# Patient Record
Sex: FEMALE | Race: WHITE | HISPANIC OR LATINO | Employment: UNEMPLOYED | URBAN - METROPOLITAN AREA
[De-identification: names, ages, dates, MRNs, and addresses within clinical notes are randomized per-mention and may not be internally consistent; named-entity substitution may affect disease eponyms.]

---
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
## 2022-01-10 ENCOUNTER — OFFICE VISIT (OUTPATIENT)
Dept: MEDICAL GROUP | Facility: MEDICAL CENTER | Age: 22
End: 2022-01-10
Attending: FAMILY MEDICINE
Payer: MEDICAID

## 2022-01-10 VITALS
BODY MASS INDEX: 37.13 KG/M2 | HEIGHT: 57 IN | DIASTOLIC BLOOD PRESSURE: 66 MMHG | HEART RATE: 80 BPM | TEMPERATURE: 97.5 F | WEIGHT: 172.1 LBS | OXYGEN SATURATION: 97 % | SYSTOLIC BLOOD PRESSURE: 112 MMHG | RESPIRATION RATE: 16 BRPM

## 2022-01-10 DIAGNOSIS — H65.92 LEFT NON-SUPPURATIVE OTITIS MEDIA: ICD-10-CM

## 2022-01-10 DIAGNOSIS — B37.31 YEAST VAGINITIS: ICD-10-CM

## 2022-01-10 PROCEDURE — 99213 OFFICE O/P EST LOW 20 MIN: CPT | Performed by: FAMILY MEDICINE

## 2022-01-10 RX ORDER — AMOXICILLIN 500 MG/1
500 CAPSULE ORAL 3 TIMES DAILY
Qty: 21 CAPSULE | Refills: 0 | Status: SHIPPED | OUTPATIENT
Start: 2022-01-10 | End: 2022-01-12

## 2022-01-10 RX ORDER — FLUCONAZOLE 150 MG/1
150 TABLET ORAL DAILY
Qty: 2 TABLET | Refills: 0 | Status: SHIPPED | OUTPATIENT
Start: 2022-01-10 | End: 2022-01-12

## 2022-01-10 ASSESSMENT — FIBROSIS 4 INDEX: FIB4 SCORE: 0.34

## 2022-01-10 NOTE — PROGRESS NOTES
"Subjective     Joselin Natarajan is a 21 y.o. female who presents with Otalgia and Laryngitis            HPI one.  Acute left ear pain-patient reports onset last night of left sided ear pain.  There is been no drainage.  She denies any sore throat.  She reports that she has some mild cough and also hoarseness also in the past 16 to 24 hours.  She does not have a history of recurrent middle ear infections.    ROS           Objective     /66   Pulse 80   Temp 36.4 °C (97.5 °F) (Temporal)   Resp 16   Ht 1.448 m (4' 9.01\")   Wt 78.1 kg (172 lb 1.6 oz)   LMP 02/21/2021   SpO2 97%   BMI 37.23 kg/m²      Physical Exam      General- alert,cooperative patient in no acute distress  Ears- normal tm on the right without redness, perforation.  Left TM and canal show moderate pinkness diffusely.  Nares- clear, pink, moist mucosa without bleeding. No purulent nasal DC  Orophx.- lips normal. Clear, pink, moist mucosa without redness or exudate. Tongue is midline  Chest-Normal to auscultation and percussion, movement is symmetric. Nontender to palpation.                       Assessment & Plan        1. Left non-suppurative otitis media    - amoxicillin (AMOXIL) 500 MG Cap; Take 1 Capsule by mouth 3 times a day.  Dispense: 21 Capsule; Refill: 0    2. Yeast vaginitis-discussed possible yeast vaginitis using amoxicillin    - fluconazole (DIFLUCAN) 150 MG tablet; Take 1 Tablet by mouth every day.  Dispense: 2 Tablet; Refill: 0    Plan: 1.  Rx amoxicillin 500 mg 3 times daily x7 days  2.  Written Rx for Diflucan if yeast vaginitis symptoms develop            "

## 2022-01-12 ENCOUNTER — POST PARTUM (OUTPATIENT)
Dept: OBGYN | Facility: CLINIC | Age: 22
End: 2022-01-12
Payer: MEDICAID

## 2022-01-12 VITALS — BODY MASS INDEX: 37.64 KG/M2 | WEIGHT: 174 LBS | SYSTOLIC BLOOD PRESSURE: 100 MMHG | DIASTOLIC BLOOD PRESSURE: 60 MMHG

## 2022-01-12 PROBLEM — B95.1 GROUP BETA STREP POSITIVE: Status: RESOLVED | Noted: 2021-11-16 | Resolved: 2022-01-12

## 2022-01-12 PROBLEM — O09.893 SUPERVISION OF OTHER HIGH RISK PREGNANCIES, THIRD TRIMESTER: Status: RESOLVED | Noted: 2021-11-24 | Resolved: 2022-01-12

## 2022-01-12 PROBLEM — O26.10 INSUFFICIENT WEIGHT GAIN DURING PREGNANCY: Status: RESOLVED | Noted: 2021-11-09 | Resolved: 2022-01-12

## 2022-01-12 PROCEDURE — 0503F POSTPARTUM CARE VISIT: CPT | Performed by: ADVANCED PRACTICE MIDWIFE

## 2022-01-12 ASSESSMENT — FIBROSIS 4 INDEX: FIB4 SCORE: 0.34

## 2022-01-12 ASSESSMENT — EDINBURGH POSTNATAL DEPRESSION SCALE (EPDS)
THE THOUGHT OF HARMING MYSELF HAS OCCURRED TO ME: NEVER
I HAVE FELT SCARED OR PANICKY FOR NO GOOD REASON: NO, NOT AT ALL
THINGS HAVE BEEN GETTING ON TOP OF ME: NO, MOST OF THE TIME I HAVE COPED QUITE WELL
TOTAL SCORE: 3
I HAVE BEEN ANXIOUS OR WORRIED FOR NO GOOD REASON: HARDLY EVER
I HAVE LOOKED FORWARD WITH ENJOYMENT TO THINGS: AS MUCH AS I EVER DID
I HAVE BEEN ABLE TO LAUGH AND SEE THE FUNNY SIDE OF THINGS: AS MUCH AS I ALWAYS COULD
I HAVE FELT SAD OR MISERABLE: NO, NOT AT ALL
I HAVE BLAMED MYSELF UNNECESSARILY WHEN THINGS WENT WRONG: NOT VERY OFTEN
I HAVE BEEN SO UNHAPPY THAT I HAVE HAD DIFFICULTY SLEEPING: NOT AT ALL
I HAVE BEEN SO UNHAPPY THAT I HAVE BEEN CRYING: NO, NEVER

## 2022-01-12 NOTE — PROGRESS NOTES
Pt here today for postpartum exam.  Delivery type: vaginal delivery 11/26/21  Currently : bottle feeding only  Desired BCM: not sure, no need now  LMP: 1/4/22  Last pap: n/a  Phone # [phone]

## 2022-01-12 NOTE — PROGRESS NOTES
Subjective   Subjective:    Joselin Natarajan is a 21 y.o. female who presents for her postpartum exam 7 weeks following . Her prenatal course was uncomplicated outside of seizure disorder.  Her delivery was uncomplicated. Her method of feeding infant is bottlefeeding. She desires nothing for her birth control method. Reports sex prior to this appointment. Patient denies crying spells, irritability, or mood swings.     Patient previously had Mirena IUD that came out. She does not desire any hormones. Recently had refill with her PCP for keppra.     Eating a regular diet without difficulty.   Bowel movement are Normal.      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  Vitals:    22 0958   BP: 100/60   Weight: 78.9 kg (174 lb)     Vitals:    22 0958   BP: 100/60     Objective    Well nourished female in no acute distress  A& O x 3  Respirations even and non labored on room air.   No edema noted and pulses WNL bilaterally in lower extremities; no claudication  BS x 4; no guarding or tenderness    Genitourinary: deferred    Assessment   Assessment:    1. Postpartum Exam  2. General Counseling and Advice on Contraception  3. Screening for Postpartum Depression    Plan   Plan:    1. Contraceptive counseling- condom use.  2. Discussed diet, exercise and resumption of sexual activity.  Discussed signs and symptoms of stress incontinence and reviewed pelvic floor exercises.    3. Follow up PRN

## 2022-01-17 ENCOUNTER — OFFICE VISIT (OUTPATIENT)
Dept: MEDICAL GROUP | Facility: MEDICAL CENTER | Age: 22
End: 2022-01-17
Attending: PHYSICIAN ASSISTANT
Payer: MEDICAID

## 2022-01-17 VITALS
BODY MASS INDEX: 36.74 KG/M2 | TEMPERATURE: 98 F | OXYGEN SATURATION: 96 % | HEART RATE: 53 BPM | WEIGHT: 170.3 LBS | HEIGHT: 57 IN | RESPIRATION RATE: 17 BRPM | SYSTOLIC BLOOD PRESSURE: 102 MMHG | DIASTOLIC BLOOD PRESSURE: 62 MMHG

## 2022-01-17 DIAGNOSIS — H65.92 LEFT NON-SUPPURATIVE OTITIS MEDIA: ICD-10-CM

## 2022-01-17 PROCEDURE — 99212 OFFICE O/P EST SF 10 MIN: CPT | Performed by: PHYSICIAN ASSISTANT

## 2022-01-17 PROCEDURE — 99213 OFFICE O/P EST LOW 20 MIN: CPT | Performed by: PHYSICIAN ASSISTANT

## 2022-01-17 RX ORDER — LEVETIRACETAM 1000 MG/1
TABLET ORAL
COMMUNITY
Start: 2022-01-07 | End: 2023-06-20

## 2022-01-17 RX ORDER — AMOXICILLIN AND CLAVULANATE POTASSIUM 875; 125 MG/1; MG/1
1 TABLET, FILM COATED ORAL 2 TIMES DAILY
Qty: 14 TABLET | Refills: 0 | Status: SHIPPED | OUTPATIENT
Start: 2022-01-17 | End: 2022-01-24

## 2022-01-17 ASSESSMENT — FIBROSIS 4 INDEX: FIB4 SCORE: 0.34

## 2022-01-17 NOTE — ASSESSMENT & PLAN NOTE
"Onset/OMAR: 3 days ago. However, was seen 7 days ago and diagnosed with left sided otitis media. She was prescribed a course of antibiotics but never took them as her pharmacy reports that they never received a prescription.  Location: bilateral ears, L > R.  Duration: Constant.   Character: Pain has resolved. \"Bilateral ears feel clogged up, L > R and is difficult to hear.\"  Alleviating/Aggravating factors: None.   Radiation: None.   Treatments tried: None.   No red flag signs.     "

## 2022-01-17 NOTE — PROGRESS NOTES
"Chief Complaint   Patient presents with   • Follow-Up     Ear fullness and difficulty hearing     Subjective:     HPI:   Joselin Natarajan is a 21 y.o. female here to discuss the evaluation and management of:    Left non-suppurative otitis media  Onset/OMAR: 3 days ago. However, was seen 7 days ago and diagnosed with left sided otitis media. She was prescribed a course of antibiotics but never took them as her pharmacy reports that they never received a prescription.  Location: bilateral ears, L > R.  Duration: Constant.   Character: Pain has resolved. \"Bilateral ears feel clogged up, L > R and is difficult to hear.\"  Alleviating/Aggravating factors: None.   Radiation: None.   Treatments tried: None.   No red flag signs.     ROS  See HPI.    No Known Allergies    Current medicines (including changes today)  Current Outpatient Medications   Medication Sig Dispense Refill   • amoxicillin-clavulanate (AUGMENTIN) 875-125 MG Tab Take 1 Tablet by mouth 2 times a day for 7 days. 14 Tablet 0   • levetiracetam (KEPPRA) 1000 MG tablet      • levETIRAcetam (KEPPRA) 500 MG Tab Take 1,000 mg by mouth 2 times a day.       No current facility-administered medications for this visit.     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 - last seizure 7/17 when meds missed, Neruology appt 8/2 wnl, no f/u 06/05/2018   • PTSD (post-traumatic stress disorder) 06/05/2018     Family History   Problem Relation Age of Onset   • Hypertension Mother    • Lung Disease Mother         asthma   • No Known Problems Father  " "  • No Known Problems Sister    • Cancer Maternal Grandmother         breast      Objective:     /62 (BP Location: Left arm, Patient Position: Sitting, BP Cuff Size: Adult)   Pulse (!) 53   Temp 36.7 °C (98 °F) (Skin)   Resp 17   Ht 1.448 m (4' 9\")   Wt 77.2 kg (170 lb 4.8 oz)   SpO2 96%  Body mass index is 36.85 kg/m².    Physical Exam:  Physical Exam  Vitals reviewed.   Constitutional:       Appearance: Normal appearance.   HENT:      Head: Normocephalic.      Right Ear: External ear normal. A middle ear effusion is present.      Left Ear: External ear normal.      Mouth/Throat:      Mouth: Mucous membranes are moist.      Pharynx: Oropharynx is clear.   Eyes:      Pupils: Pupils are equal, round, and reactive to light.   Cardiovascular:      Rate and Rhythm: Normal rate and regular rhythm.      Heart sounds: Normal heart sounds.   Pulmonary:      Effort: Pulmonary effort is normal.      Breath sounds: Normal breath sounds.   Musculoskeletal:      Cervical back: Normal range of motion.   Lymphadenopathy:      Cervical: No cervical adenopathy.   Neurological:      Mental Status: She is alert.       Assessment and Plan:     The following treatment plan was discussed:    1. Left non-suppurative otitis media  - This is an acute condition.  - Plan: Start on antibiotic treatment for infection.  She has been instructed to complete the entire course of the antibiotic despite early symptom resolution.  Stay adequately hydrated and get plenty of rest.  Return to clinic if symptoms persist or worsen despite treatment.  - amoxicillin-clavulanate (AUGMENTIN) 875-125 MG Tab; Take 1 Tablet by mouth 2 times a day for 7 days.  Dispense: 14 Tablet; Refill: 0     Any change or worsening of signs or symptoms, patient encouraged to follow-up or report to emergency room for further evaluation. Patient verbalizes understanding and agrees.    Follow-Up: Return if symptoms worsen or fail to improve.      PLEASE NOTE: This " dictation was created using voice recognition software. I have made every reasonable attempt to correct obvious errors, but I expect that there are errors of grammar and possibly content that I did not discover before finalizing the note.

## 2022-04-24 ENCOUNTER — HOSPITAL ENCOUNTER (EMERGENCY)
Facility: MEDICAL CENTER | Age: 22
End: 2022-04-24
Attending: EMERGENCY MEDICINE
Payer: MEDICAID

## 2022-04-24 ENCOUNTER — APPOINTMENT (OUTPATIENT)
Dept: RADIOLOGY | Facility: MEDICAL CENTER | Age: 22
End: 2022-04-24
Attending: EMERGENCY MEDICINE
Payer: MEDICAID

## 2022-04-24 VITALS
TEMPERATURE: 98 F | HEIGHT: 57 IN | RESPIRATION RATE: 18 BRPM | BODY MASS INDEX: 40.76 KG/M2 | DIASTOLIC BLOOD PRESSURE: 65 MMHG | HEART RATE: 67 BPM | OXYGEN SATURATION: 100 % | SYSTOLIC BLOOD PRESSURE: 118 MMHG | WEIGHT: 188.93 LBS

## 2022-04-24 DIAGNOSIS — R11.0 NAUSEA: ICD-10-CM

## 2022-04-24 DIAGNOSIS — R10.13 EPIGASTRIC PAIN: ICD-10-CM

## 2022-04-24 DIAGNOSIS — K80.20 CALCULUS OF GALLBLADDER WITHOUT CHOLECYSTITIS WITHOUT OBSTRUCTION: ICD-10-CM

## 2022-04-24 LAB
ALBUMIN SERPL BCP-MCNC: 4.4 G/DL (ref 3.2–4.9)
ALBUMIN/GLOB SERPL: 1.2 G/DL
ALP SERPL-CCNC: 113 U/L (ref 30–99)
ALT SERPL-CCNC: 21 U/L (ref 2–50)
ANION GAP SERPL CALC-SCNC: 12 MMOL/L (ref 7–16)
APPEARANCE UR: CLEAR
AST SERPL-CCNC: 20 U/L (ref 12–45)
BASOPHILS # BLD AUTO: 0.3 % (ref 0–1.8)
BASOPHILS # BLD: 0.03 K/UL (ref 0–0.12)
BILIRUB SERPL-MCNC: 0.3 MG/DL (ref 0.1–1.5)
BILIRUB UR QL STRIP.AUTO: NEGATIVE
BUN SERPL-MCNC: 13 MG/DL (ref 8–22)
CALCIUM SERPL-MCNC: 9.4 MG/DL (ref 8.5–10.5)
CHLORIDE SERPL-SCNC: 102 MMOL/L (ref 96–112)
CO2 SERPL-SCNC: 25 MMOL/L (ref 20–33)
COLOR UR: YELLOW
CREAT SERPL-MCNC: 0.64 MG/DL (ref 0.5–1.4)
EOSINOPHIL # BLD AUTO: 0.33 K/UL (ref 0–0.51)
EOSINOPHIL NFR BLD: 3.2 % (ref 0–6.9)
ERYTHROCYTE [DISTWIDTH] IN BLOOD BY AUTOMATED COUNT: 37.9 FL (ref 35.9–50)
GFR SERPLBLD CREATININE-BSD FMLA CKD-EPI: 129 ML/MIN/1.73 M 2
GLOBULIN SER CALC-MCNC: 3.6 G/DL (ref 1.9–3.5)
GLUCOSE SERPL-MCNC: 81 MG/DL (ref 65–99)
GLUCOSE UR STRIP.AUTO-MCNC: NEGATIVE MG/DL
HCG SERPL QL: NEGATIVE
HCT VFR BLD AUTO: 39.8 % (ref 37–47)
HGB BLD-MCNC: 13.3 G/DL (ref 12–16)
IMM GRANULOCYTES # BLD AUTO: 0.03 K/UL (ref 0–0.11)
IMM GRANULOCYTES NFR BLD AUTO: 0.3 % (ref 0–0.9)
KETONES UR STRIP.AUTO-MCNC: NEGATIVE MG/DL
LEUKOCYTE ESTERASE UR QL STRIP.AUTO: NEGATIVE
LIPASE SERPL-CCNC: 18 U/L (ref 11–82)
LYMPHOCYTES # BLD AUTO: 3.16 K/UL (ref 1–4.8)
LYMPHOCYTES NFR BLD: 30.3 % (ref 22–41)
MCH RBC QN AUTO: 28.6 PG (ref 27–33)
MCHC RBC AUTO-ENTMCNC: 33.4 G/DL (ref 33.6–35)
MCV RBC AUTO: 85.6 FL (ref 81.4–97.8)
MICRO URNS: NORMAL
MONOCYTES # BLD AUTO: 0.68 K/UL (ref 0–0.85)
MONOCYTES NFR BLD AUTO: 6.5 % (ref 0–13.4)
NEUTROPHILS # BLD AUTO: 6.19 K/UL (ref 2–7.15)
NEUTROPHILS NFR BLD: 59.4 % (ref 44–72)
NITRITE UR QL STRIP.AUTO: NEGATIVE
NRBC # BLD AUTO: 0 K/UL
NRBC BLD-RTO: 0 /100 WBC
PH UR STRIP.AUTO: 5.5 [PH] (ref 5–8)
PLATELET # BLD AUTO: 287 K/UL (ref 164–446)
PMV BLD AUTO: 10.3 FL (ref 9–12.9)
POTASSIUM SERPL-SCNC: 3.4 MMOL/L (ref 3.6–5.5)
PROT SERPL-MCNC: 8 G/DL (ref 6–8.2)
PROT UR QL STRIP: NEGATIVE MG/DL
RBC # BLD AUTO: 4.65 M/UL (ref 4.2–5.4)
RBC UR QL AUTO: NEGATIVE
SODIUM SERPL-SCNC: 139 MMOL/L (ref 135–145)
SP GR UR STRIP.AUTO: 1.02
UROBILINOGEN UR STRIP.AUTO-MCNC: 0.2 MG/DL
WBC # BLD AUTO: 10.4 K/UL (ref 4.8–10.8)

## 2022-04-24 PROCEDURE — 81003 URINALYSIS AUTO W/O SCOPE: CPT

## 2022-04-24 PROCEDURE — 74177 CT ABD & PELVIS W/CONTRAST: CPT

## 2022-04-24 PROCEDURE — A9270 NON-COVERED ITEM OR SERVICE: HCPCS | Performed by: EMERGENCY MEDICINE

## 2022-04-24 PROCEDURE — 96374 THER/PROPH/DIAG INJ IV PUSH: CPT

## 2022-04-24 PROCEDURE — 76705 ECHO EXAM OF ABDOMEN: CPT

## 2022-04-24 PROCEDURE — 700111 HCHG RX REV CODE 636 W/ 250 OVERRIDE (IP): Performed by: EMERGENCY MEDICINE

## 2022-04-24 PROCEDURE — 80053 COMPREHEN METABOLIC PANEL: CPT

## 2022-04-24 PROCEDURE — 700102 HCHG RX REV CODE 250 W/ 637 OVERRIDE(OP): Performed by: EMERGENCY MEDICINE

## 2022-04-24 PROCEDURE — 700117 HCHG RX CONTRAST REV CODE 255: Performed by: EMERGENCY MEDICINE

## 2022-04-24 PROCEDURE — 83690 ASSAY OF LIPASE: CPT

## 2022-04-24 PROCEDURE — 85025 COMPLETE CBC W/AUTO DIFF WBC: CPT

## 2022-04-24 PROCEDURE — 36415 COLL VENOUS BLD VENIPUNCTURE: CPT

## 2022-04-24 PROCEDURE — 99284 EMERGENCY DEPT VISIT MOD MDM: CPT

## 2022-04-24 PROCEDURE — 84703 CHORIONIC GONADOTROPIN ASSAY: CPT

## 2022-04-24 RX ORDER — ONDANSETRON 4 MG/1
4 TABLET, ORALLY DISINTEGRATING ORAL EVERY 6 HOURS PRN
Qty: 20 TABLET | Refills: 1 | Status: SHIPPED | OUTPATIENT
Start: 2022-04-24 | End: 2022-05-03

## 2022-04-24 RX ORDER — ALUMINA, MAGNESIA, AND SIMETHICONE 2400; 2400; 240 MG/30ML; MG/30ML; MG/30ML
10 SUSPENSION ORAL 4 TIMES DAILY PRN
Qty: 560 ML | Refills: 0 | Status: SHIPPED | OUTPATIENT
Start: 2022-04-24 | End: 2022-05-03

## 2022-04-24 RX ORDER — KETOROLAC TROMETHAMINE 30 MG/ML
15 INJECTION, SOLUTION INTRAMUSCULAR; INTRAVENOUS ONCE
Status: COMPLETED | OUTPATIENT
Start: 2022-04-24 | End: 2022-04-24

## 2022-04-24 RX ORDER — ACETAMINOPHEN 325 MG/1
650 TABLET ORAL ONCE
Status: COMPLETED | OUTPATIENT
Start: 2022-04-24 | End: 2022-04-24

## 2022-04-24 RX ORDER — FAMOTIDINE 20 MG/1
20 TABLET, FILM COATED ORAL ONCE
Status: COMPLETED | OUTPATIENT
Start: 2022-04-24 | End: 2022-04-24

## 2022-04-24 RX ORDER — ALUMINA, MAGNESIA, AND SIMETHICONE 2400; 2400; 240 MG/30ML; MG/30ML; MG/30ML
20 SUSPENSION ORAL ONCE
Status: COMPLETED | OUTPATIENT
Start: 2022-04-24 | End: 2022-04-24

## 2022-04-24 RX ORDER — HYDROCODONE BITARTRATE AND ACETAMINOPHEN 5; 325 MG/1; MG/1
1 TABLET ORAL ONCE
Status: COMPLETED | OUTPATIENT
Start: 2022-04-24 | End: 2022-04-24

## 2022-04-24 RX ADMIN — ALUMINUM HYDROXIDE, MAGNESIUM HYDROXIDE, AND DIMETHICONE 20 ML: 400; 400; 40 SUSPENSION ORAL at 03:41

## 2022-04-24 RX ADMIN — HYDROCODONE BITARTRATE AND ACETAMINOPHEN 1 TABLET: 5; 325 TABLET ORAL at 05:50

## 2022-04-24 RX ADMIN — ACETAMINOPHEN 650 MG: 325 TABLET, FILM COATED ORAL at 05:50

## 2022-04-24 RX ADMIN — KETOROLAC TROMETHAMINE 15 MG: 30 INJECTION, SOLUTION INTRAMUSCULAR at 04:43

## 2022-04-24 RX ADMIN — IOHEXOL 100 ML: 350 INJECTION, SOLUTION INTRAVENOUS at 06:43

## 2022-04-24 RX ADMIN — FAMOTIDINE 20 MG: 20 TABLET, FILM COATED ORAL at 04:15

## 2022-04-24 ASSESSMENT — FIBROSIS 4 INDEX: FIB4 SCORE: 0.36

## 2022-04-24 NOTE — ED PROVIDER NOTES
ED Provider Note    Scribed for Ryan Liriano M.D. by Binh Hays. 4/24/2022,  3:49 AM.    Means of Arrival: Walk-in  History obtained from: Patient  History limited by: None    CHIEF COMPLAINT  Chief Complaint   Patient presents with    Epigastric Pain     [facility]  Joselin Natarajan is a 22 y.o. female who presents to the Emergency Department for worsening epigastric abdominal pain onset 2 days ago. She describes the pain is a constant burning sensation. She reports associated nausea, dizziness, constipation, and belching. She notes that her pain is exacerbated by standing and palpation. She states that laying on her back somewhat alleviates her symptoms. She denies associated fever, diarrhea, blood in the urine, dysuria, or vomiting. She denies any history of abdominal surgeries.    REVIEW OF SYSTEMS  CONSTITUTIONAL:  No fever.  CARDIOVASCULAR:  No chest discomfort.  RESPIRATORY:  No pleuritic chest pain.  GASTROINTESTINAL:  Positive epigastric abdominal pain. Positive constipation. Positive nausea. No diarrhea or vomiting.  GENITOURINARY:   No dysuria. No blood in the urine.  MUSCULOSKELETAL:  No arthralgia.  SKIN:  No rash or suspicious lesions.  NEUROLOGIC:   No headache.  See HPI for further details.   All other systems are negative.     PAST MEDICAL HISTORY  Past Medical History:   Diagnosis Date    Seizure (HCC)     since 8 grade     FAMILY HISTORY  Family History   Problem Relation Age of Onset    Hypertension Mother     Lung Disease Mother         asthma    No Known Problems Father     No Known Problems Sister     Cancer Maternal Grandmother         breast     SOCIAL HISTORY   reports that she has quit smoking. Her smoking use included cigarettes. She smoked 0.00 packs per day. She has never used smokeless tobacco. She reports previous drug use. She reports that she does not drink alcohol.    SURGICAL HISTORY  History reviewed. No pertinent surgical history.    CURRENT MEDICATIONS  Home Medications  "      Reviewed by Columba Salmon R.N. (Registered Nurse) on 04/24/22 at 0254  Med List Status: <None>     Medication Last Dose Status   levetiracetam (KEPPRA) 1000 MG tablet  Active   levETIRAcetam (KEPPRA) 500 MG Tab  Active                  ALLERGIES  No Known Allergies    PHYSICAL EXAM  VITAL SIGNS: /72   Pulse 80   Temp 37.1 °C (98.8 °F) (Temporal)   Resp 16   Ht 1.448 m (4' 9\")   Wt 85.7 kg (188 lb 15 oz)   LMP 03/16/2022 (Approximate)   SpO2 99%   BMI 40.89 kg/m²  Gen: Alert, no acute distress  HEENT: ATNC  Eyes: PERRL, EOMI, normal conjunctiva.   Neck: trachea midline  Resp: no respiratory distress  CV: No JVD  Abd: non-distended, Tenderness to palpation in epigastric and left upper quadrant, no rebound or guarding  Ext: No deformities  Back: No CVA tenderness  Psych: normal mood  Neuro: speech fluent     DIAGNOSTIC STUDIES / PROCEDURES     LABS  Labs Reviewed   CBC WITH DIFFERENTIAL - Abnormal; Notable for the following components:       Result Value    MCHC 33.4 (*)     All other components within normal limits   COMP METABOLIC PANEL - Abnormal; Notable for the following components:    Potassium 3.4 (*)     Alkaline Phosphatase 113 (*)     Globulin 3.6 (*)     All other components within normal limits   LIPASE   HCG QUAL SERUM   URINALYSIS   ESTIMATED GFR     All labs reviewed by me.    RADIOLOGY  US-RUQ   Final Result      Cholelithiasis without biliary dilatation or positive sonographic Cooley's sign      CT-ABDOMEN-PELVIS WITH    (Results Pending)     The radiologist’s interpretation of all radiology studies have been reviewed by me.    COURSE & MEDICAL DECISION MAKING  Pertinent Labs & Imaging studies reviewed. (See chart for details)    3:49 AM Patient seen and examined at bedside. Ordered for labs and imaging to evaluate. Patient will be treated with Maalox plus ES 20ml for her symptoms.     3:54 AM Patient will be treated with Pepcid 20 mg.    5:47 AM  Patient admitted to ED observation " for further imaging studies, response to treatment.    Medical Decision Making:  Patient presents with left upper quadrant, epigastric pain that has been worsening over time.  Initial evaluation not suggestive of surgical abdomen.  Patient's labs reassuring.  She does have cholelithiasis but no evidence of acute cholecystitis.  Patient symptoms not improved with Maalox.  Despite reassuring labs and ultrasound showing no acute findings, the patient reports that her abdominal pain is worsening.  She denies any chest symptoms with this.  Given this, will perform further studies.  Patient admitted to ED observation for CAT scan, further medications.    Pt signed out to Dr. Cooley        FINAL IMPRESSION  1. Epigastric pain    2. Calculus of gallbladder without cholecystitis without obstruction         Binh NEAL (Scribe), am scribing for, and in the presence of, Ryan Liriano M.D..    Electronically signed by: Binh Hays (Kait), 4/24/2022    Ryan NEAL M.D. personally performed the services described in this documentation, as scribed by Binh Hays in my presence, and it is both accurate and complete. C.    The note accurately reflects work and decisions made by me.  Ryan Liriano M.D.  4/24/2022  5:48 AM    This dictation was created using voice recognition software. The accuracy of the dictation is limited to the abilities of the software. I expect there may be some errors of grammar and possibly content. The nursing notes were reviewed and certain aspects of this information were incorporated into this note.

## 2022-04-24 NOTE — DISCHARGE SUMMARY
ED Observation Discharge Summary    Patient:Joselin Natarajan  Patient : 2000  Patient MRN: [id number]  Patient PCP: ROSS Nuñez    Admit Date: 2022  Discharge Date and Time: 22 7:19 AM  Discharge Diagnosis: cholelithiasis/pelvic congestion syndrome  Discharge Attending: Maritza Cooley M.D.  Discharge Service: ED Observation    ED Course  Joselin is a 22 y.o. female who was evaluated at [facility] for abdominal pain.  Please see full H&P by Dr. Liriano for details.  Briefly this is a 22-year-old female who presented for epigastric pain.  Despite medical management she had persistent pain and therefore CT of the abdomen was ordered.  Right upper quadrant ultrasound was consistent with cholelithiasis without other acute findings.  Upon reassessment this morning patient is feeling well and reports that her pain has completely resolved.  Abdominal exam is benign.  CT of the abdomen returns and also demonstrates cholelithiasis without inflammation or infection.  CT did demonstrate mildly dilated left gonadal vein which could support diagnosis of pelvic venous congestion.  Upon reassessing patient she has not had any pain in the pelvic region all of her pain has been epigastric.  Her pain is now resolved and I advised her her symptoms are most likely related to symptomatic cholelithiasis.  Dr. Liriano has placed referral for follow-up with general surgery and patient is advised on this.  I advised her on dietary management of cholelithiasis by avoiding fatty foods and eating a healthy diet.  She is provided with prescriptions for Zofran and Maalox and given strict return precautions.  I did advise her of the incidental finding on CT that could be notable for pelvic venous congestion, I advised her if she had any issues with pelvic pain she should [facility]  She is amenable to this plan.  She will return for  "any acute or worsening symptoms.  She patient is then discharged in stable condition.      Discharge Exam:  /65   Pulse 67   Temp 36.7 °C (98 °F) (Temporal)   Resp 18   Ht 1.448 m (4' 9\")   Wt 85.7 kg (188 lb 15 oz)   LMP 03/16/2022 (Approximate)   SpO2 100%   BMI 40.89 kg/m² .    Constitutional: Awake and alert. Nontoxic  HENT:  Grossly normal  Eyes: Grossly normal  Neck: Normal range of motion  Cardiovascular: Normal heart rate   Thorax & Lungs: No respiratory distress  Abdomen: Nontender, no tenderness to deep palpation of all quadrants.  Negative Cooley sign, negative McBurney's point tenderness  Skin:  No pathologic rash.   Extremities: Well perfused  Psychiatric: Affect normal    Labs  Results for orders placed or performed during the hospital encounter of 04/24/22   CBC WITH DIFFERENTIAL   Result Value Ref Range    WBC 10.4 4.8 - 10.8 K/uL    RBC 4.65 4.20 - 5.40 M/uL    Hemoglobin 13.3 12.0 - 16.0 g/dL    Hematocrit 39.8 37.0 - 47.0 %    MCV 85.6 81.4 - 97.8 fL    MCH 28.6 27.0 - 33.0 pg    MCHC 33.4 (L) 33.6 - 35.0 g/dL    RDW 37.9 35.9 - 50.0 fL    Platelet Count 287 164 - 446 K/uL    MPV 10.3 9.0 - 12.9 fL    Neutrophils-Polys 59.40 44.00 - 72.00 %    Lymphocytes 30.30 22.00 - 41.00 %    Monocytes 6.50 0.00 - 13.40 %    Eosinophils 3.20 0.00 - 6.90 %    Basophils 0.30 0.00 - 1.80 %    Immature Granulocytes 0.30 0.00 - 0.90 %    Nucleated RBC 0.00 /100 WBC    Neutrophils (Absolute) 6.19 2.00 - 7.15 K/uL    Lymphs (Absolute) 3.16 1.00 - 4.80 K/uL    Monos (Absolute) 0.68 0.00 - 0.85 K/uL    Eos (Absolute) 0.33 0.00 - 0.51 K/uL    Baso (Absolute) 0.03 0.00 - 0.12 K/uL    Immature Granulocytes (abs) 0.03 0.00 - 0.11 K/uL    NRBC (Absolute) 0.00 K/uL   COMP METABOLIC PANEL   Result Value Ref Range    Sodium 139 135 - 145 mmol/L    Potassium 3.4 (L) 3.6 - 5.5 mmol/L    Chloride 102 96 - 112 mmol/L    Co2 25 20 - 33 mmol/L    Anion Gap 12.0 7.0 - 16.0    Glucose 81 65 - 99 mg/dL    Bun 13 8 - 22 " mg/dL    Creatinine 0.64 0.50 - 1.40 mg/dL    Calcium 9.4 8.5 - 10.5 mg/dL    AST(SGOT) 20 12 - 45 U/L    ALT(SGPT) 21 2 - 50 U/L    Alkaline Phosphatase 113 (H) 30 - 99 U/L    Total Bilirubin 0.3 0.1 - 1.5 mg/dL    Albumin 4.4 3.2 - 4.9 g/dL    Total Protein 8.0 6.0 - 8.2 g/dL    Globulin 3.6 (H) 1.9 - 3.5 g/dL    A-G Ratio 1.2 g/dL   LIPASE   Result Value Ref Range    Lipase 18 11 - 82 U/L   HCG QUAL SERUM   Result Value Ref Range    Beta-Hcg Qualitative Serum Negative Negative   URINALYSIS (UA)    Specimen: Urine   Result Value Ref Range    Color Yellow     Character Clear     Specific Gravity 1.020 <1.035    Ph 5.5 5.0 - 8.0    Glucose Negative Negative mg/dL    Ketones Negative Negative mg/dL    Protein Negative Negative mg/dL    Bilirubin Negative Negative    Urobilinogen, Urine 0.2 Negative    Nitrite Negative Negative    Leukocyte Esterase Negative Negative    Occult Blood Negative Negative    Micro Urine Req see below    ESTIMATED GFR   Result Value Ref Range    GFR (CKD-EPI) 129 >60 mL/min/1.73 m 2       Radiology  CT-ABDOMEN-PELVIS WITH   Final Result      Cholelithiasis      Mildly dilated left gonadal vein could support a diagnosis of pelvic venous congestion      US-RUQ   Final Result      Cholelithiasis without biliary dilatation or positive sonographic Cooley's sign          Disposition: Discharged in stable condition    Follow up: General surgery, [facility] clinic, primary care doctor    Medications:   New Prescriptions    MAG HYDROX-AL HYDROX-SIMETH (MAALOX PLUS ES OR MYLANTA DS) 400-400-40 MG/5ML SUSPENSION    Take 10 mL by mouth 4 times a day as needed (abdominal pain).    ONDANSETRON (ZOFRAN ODT) 4 MG TABLET DISPERSIBLE    Take 1 Tablet by mouth every 6 hours as needed for Nausea.       Discharge Condition: Stable    Electronically signed by: Maritza Cooley M.D., 4/24/2022 6:27 AM

## 2022-04-24 NOTE — ED NOTES
Pt abdominal pain 8/10  Medicated per MAR  Aware of POC - plan for CT  No other needs at this time

## 2022-04-24 NOTE — DISCHARGE INSTRUCTIONS
As we discussed your veins in your lower pelvis on the left side were slightly enlarged consistent with pelvic congestion syndrome, please follow-up with your gynecologist for this.  If you have worsening symptoms return to the emergency department.  We have placed a referral for your general surgery follow-up for your gallstones.

## 2022-04-24 NOTE — ED NOTES
Pt discharge home. Pt given discharge instructions and prescription. Pt verbalized understanding, all questions answered ,vss upon d/c. Pt steady on feet upon discharge   will be driving pt home

## 2022-04-27 ENCOUNTER — TELEPHONE (OUTPATIENT)
Dept: OBGYN | Facility: CLINIC | Age: 22
End: 2022-04-27
Payer: MEDICAID

## 2022-05-03 ENCOUNTER — GYNECOLOGY VISIT (OUTPATIENT)
Dept: OBGYN | Facility: CLINIC | Age: 22
End: 2022-05-03
Payer: MEDICAID

## 2022-05-03 VITALS
SYSTOLIC BLOOD PRESSURE: 118 MMHG | BODY MASS INDEX: 40.25 KG/M2 | WEIGHT: 186 LBS | DIASTOLIC BLOOD PRESSURE: 70 MMHG | HEART RATE: 84 BPM

## 2022-05-03 DIAGNOSIS — Z87.42 HISTORY OF DYSMENORRHEA: ICD-10-CM

## 2022-05-03 DIAGNOSIS — R10.13 EPIGASTRIC PAIN: ICD-10-CM

## 2022-05-03 PROCEDURE — 99214 OFFICE O/P EST MOD 30 MIN: CPT | Performed by: ADVANCED PRACTICE MIDWIFE

## 2022-05-03 ASSESSMENT — FIBROSIS 4 INDEX: FIB4 SCORE: 0.3

## 2022-05-03 NOTE — NON-PROVIDER
Pt here for ER f/u.     Pt states she is no longer having pain  Good# [phone]  LMP: 4/30/22  Pharmacy confirmed

## 2022-05-03 NOTE — PROGRESS NOTES
Joselin Natarajan is a 22 y.o. female who presents for ER follow up        [facility] Comments: Pt presents for ER follow up.  Patient's last menstrual period was 04/30/2022.. Patient reports that she was seen in emergency department for persistent upper abdominal pain. While there she had ultrasounds and CT scan. She reports she was told that her scan showed pelvic congestion syndrome and it was recommended she follow up with OB/GYN. She denies pain with intercourse, pelvic pain, or vaginal discharge. She recently delivered her second child in 11/2021 vaginally without complications.     Review of Systems   Pertinent positives documented in [facility] and all other systems reviewed & are negative      Past Medical History:   Diagnosis Date   • Seizure (HCC)     since 8 grade       Medications:   Current Outpatient Medications Ordered in Epic   Medication Sig Dispense Refill   • levetiracetam (KEPPRA) 1000 MG tablet      • levETIRAcetam (KEPPRA) 500 MG Tab Take 1,000 mg by mouth 2 times a day.     • ondansetron (ZOFRAN ODT) 4 MG TABLET DISPERSIBLE Take 1 Tablet by mouth every 6 hours as needed for Nausea. (Patient not taking: Reported on 5/3/2022) 20 Tablet 1   • mag hydrox-al hydrox-simeth (MAALOX PLUS ES OR MYLANTA DS) 400-400-40 MG/5ML Suspension Take 10 mL by mouth 4 times a day as needed (abdominal pain). (Patient not taking: Reported on 5/3/2022) 560 mL 0     No current Epic-ordered facility-administered medications on file.          Objective:   Vital measurements:  Wt 84.4 kg (186 lb)   Body mass index is 40.25 kg/m². (Goal BM I>18 <25)    Physical Exam   Nursing note and vitals reviewed.  Constitutional: She is oriented to person, place, and time. She appears well-developed and well-nourished. No distress.     Abdominal: Soft. Bowel sounds are normal. She exhibits no distension and no mass. No tenderness. She has no rebound and no guarding.     Genitourinary:  deferred    Musculoskeletal: Normal range of  motion. She exhibits no edema and no tenderness.     Skin: Skin is warm and dry. No rash noted. She is not diaphoretic. No erythema. No pallor.     Psychiatric: She has a normal mood and affect. Her behavior is normal. Judgment and thought content normal.          Assessment:     1. Epigastric pain     2. History of dysmenorrhea         Plan:   1. Discussed recent findings on imaging. Reviewed that this was an incidental finding and in light of her postpartum status, will monitor at this time. We reviewed conservative treatment for pelvic congestion syndrome that includes use of contraception.  2. We discussed that her painful periods might benefit from use of oral contraception.   3. Follow up in 8 months for annual exam and pap.

## 2022-06-15 NOTE — PATIENT INSTRUCTIONS
Patient was advised to take all meds as directed , and to follow up regularly , to bring all meds each time she is coming to see me, medication SE discussed  . RTC as needed   ER warnings reviewed      Skyrizi Counseling: I discussed with the patient the risks of risankizumab-rzaa including but not limited to immunosuppression, and serious infections.  The patient understands that monitoring is required including a PPD at baseline and must alert us or the primary physician if symptoms of infection or other concerning signs are noted.

## 2022-09-15 ENCOUNTER — OFFICE VISIT (OUTPATIENT)
Dept: URGENT CARE | Facility: CLINIC | Age: 22
End: 2022-09-15
Payer: MEDICAID

## 2022-09-15 VITALS
HEIGHT: 57 IN | OXYGEN SATURATION: 97 % | HEART RATE: 56 BPM | BODY MASS INDEX: 39.91 KG/M2 | DIASTOLIC BLOOD PRESSURE: 74 MMHG | WEIGHT: 185 LBS | RESPIRATION RATE: 16 BRPM | TEMPERATURE: 97 F | SYSTOLIC BLOOD PRESSURE: 112 MMHG

## 2022-09-15 DIAGNOSIS — H66.91 ACUTE RIGHT OTITIS MEDIA: ICD-10-CM

## 2022-09-15 PROCEDURE — 99214 OFFICE O/P EST MOD 30 MIN: CPT | Performed by: NURSE PRACTITIONER

## 2022-09-15 RX ORDER — AMOXICILLIN 875 MG/1
875 TABLET, COATED ORAL 2 TIMES DAILY
Qty: 14 TABLET | Refills: 0 | Status: SHIPPED | OUTPATIENT
Start: 2022-09-15 | End: 2022-09-22

## 2022-09-15 RX ORDER — CLINDAMYCIN HYDROCHLORIDE 150 MG/1
CAPSULE ORAL
COMMUNITY
Start: 2022-09-09 | End: 2023-06-20

## 2022-09-15 ASSESSMENT — FIBROSIS 4 INDEX: FIB4 SCORE: 0.3

## 2022-09-15 NOTE — PROGRESS NOTES
Chief Complaint   Patient presents with    Otalgia     X 3 days, sore throat, right ear pain       HISTORY OF PRESENT ILLNESS: Patient is a pleasant 22 y.o. female who presents today due to three days of nasal congestion, fever, chills, headache, cough, and bilateral ear pain. She denies associated sinus pressure, difficulty breathing, confusion, nausea, vomiting or diarrhea. She has tried OTC cold/sinus medication at home without much improvement. Admits to a history of ear infections in the past. No known ill contacts at home. No recent antibiotic usage.       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    Current Outpatient Medications Ordered in Epic   Medication Sig Dispense Refill    amoxicillin (AMOXIL) 875 MG tablet Take 1 Tablet by mouth 2 times a day for 7 days. 14 Tablet 0    levetiracetam (KEPPRA) 1000 MG tablet       levETIRAcetam (KEPPRA) 500 MG Tab Take 1,000 mg by mouth 2 times a day.      clindamycin (CLEOCIN) 150 MG Cap TAKE 2 TABLETS BY MOUTH EVERY 8 HOURS UNITL GONE       No current Epic-ordered facility-administered medications on file.       Past Medical History:   Diagnosis Date    Seizure ([facility])     since 8 grade       Social History     Tobacco Use    Smoking status: Former     Packs/day: 0.00     Types: Cigarettes    Smokeless tobacco: Never    Tobacco comments:     occ- for stress   Vaping Use    Vaping Use: Never used   Substance Use Topics    Alcohol use: No    Drug use: Not Currently       Family Status   Relation Name Status    Mo  Alive    Fa  Alive    Sis 2 Alive    MGMo  Alive    MGFa      PGMo      PGFa       Family History   Problem Relation Age of Onset  "   Hypertension Mother     Lung Disease Mother         asthma    No Known Problems Father     No Known Problems Sister     Cancer Maternal Grandmother         breast       ROS:  Review of Systems   Constitutional: Positive for fever, chills, fatigue. Negative for weight loss.   HENT: Positive for ear pain, sore throat, nasal congestion. Negative for nosebleeds, neck pain.    Eyes: Negative for vision changes.   Neuro: Negative for sensory changes, weakness, seizure, LOC.  Cardiovascular: Negative for chest pain, palpitations, orthopnea and leg swelling.   Respiratory: Positive for cough. Negative for sputum production, shortness of breath and wheezing.   Gastrointestinal: Negative for abdominal pain, nausea, vomiting or diarrhea.    Skin: Negative for rash, diaphoresis.     Exam:  /74   Pulse (!) 56   Temp 36.1 °C (97 °F) (Temporal)   Resp 16   Ht 1.448 m (4' 9\")   Wt 83.9 kg (185 lb)   SpO2 97%   General: well-nourished, well-developed female in NAD  Head: normocephalic, atraumatic  Eyes: PERRLA, no conjunctival injection, acuity grossly intact, lids normal.  Ears: normal shape and symmetry, no tenderness, no discharge. External canals are without any significant edema or erythema.  Left tympanic membrane is dull, minimal erythema, intact.  Right tympanic membrane is injected, erythematous, bulging, intact.  Gross auditory acuity is intact.  Nose: symmetrical without tenderness, erythema and swelling noted bilateral turbinates, clear discharge. No sinus tenderness.   Mouth/Throat: reasonable hygiene, no exudates or tonsillar enlargement. Erythema is present.   Neck: no masses, range of motion within normal limits, no tracheal deviation. No obvious thyroid enlargement.   Lymph: no cervical adenopathy. No supraclavicular adenopathy.   Neuro: alert and oriented. Cranial nerves 1-12 grossly intact. No sensory deficit.   Cardiovascular: regular rate and rhythm. No edema.  Pulmonary: no distress. Chest is " symmetrical with respiration, no wheezes, crackles, or rhonchi.   Musculoskeletal: no clubbing, appropriate muscle tone, gait is stable.  Skin: warm, dry, intact, no clubbing, no cyanosis, no rashes.   Psych: appropriate mood, affect, judgement.         Assessment/Plan:  1. Acute right otitis media  amoxicillin (AMOXIL) 875 MG tablet            Antibiotic as directed, potential side effects of medication discussed. Flonase as directed.   Sleep with HOB elevated, humidifier at night, rest, increase fluid intake.   Supportive care, differential diagnoses, and indications for immediate follow-up discussed with patient.   Pathogenesis of diagnosis discussed including typical length and natural progression.   Instructed to return to clinic or nearest emergency department for any change in condition, further concerns, or worsening of symptoms.  Patient states understanding of the plan of care and discharge instructions.  Instructed to make an appointment, for follow up, with her primary care provider.        Please note that this dictation was created using voice recognition software. I have made every reasonable attempt to correct obvious errors, but I expect that there are errors of grammar and possibly content that I did not discover before finalizing the note. N95 and safety glasses used for entire visit.       NORM Mays.

## 2022-09-16 ENCOUNTER — HOSPITAL ENCOUNTER (EMERGENCY)
Facility: MEDICAL CENTER | Age: 22
End: 2022-09-16
Attending: EMERGENCY MEDICINE
Payer: MEDICAID

## 2022-09-16 VITALS
WEIGHT: 187.39 LBS | OXYGEN SATURATION: 95 % | RESPIRATION RATE: 18 BRPM | HEIGHT: 57 IN | HEART RATE: 85 BPM | TEMPERATURE: 98.2 F | BODY MASS INDEX: 40.43 KG/M2 | SYSTOLIC BLOOD PRESSURE: 125 MMHG | DIASTOLIC BLOOD PRESSURE: 85 MMHG

## 2022-09-16 DIAGNOSIS — R23.8 VESICLE OF SKIN: ICD-10-CM

## 2022-09-16 DIAGNOSIS — J02.9 PHARYNGITIS, UNSPECIFIED ETIOLOGY: ICD-10-CM

## 2022-09-16 LAB
HETEROPH AB SER QL: NEGATIVE
S PYO DNA SPEC NAA+PROBE: NOT DETECTED

## 2022-09-16 PROCEDURE — 86308 HETEROPHILE ANTIBODY SCREEN: CPT

## 2022-09-16 PROCEDURE — A9270 NON-COVERED ITEM OR SERVICE: HCPCS | Performed by: EMERGENCY MEDICINE

## 2022-09-16 PROCEDURE — 36415 COLL VENOUS BLD VENIPUNCTURE: CPT

## 2022-09-16 PROCEDURE — 87651 STREP A DNA AMP PROBE: CPT

## 2022-09-16 PROCEDURE — 700102 HCHG RX REV CODE 250 W/ 637 OVERRIDE(OP): Performed by: EMERGENCY MEDICINE

## 2022-09-16 PROCEDURE — 99283 EMERGENCY DEPT VISIT LOW MDM: CPT

## 2022-09-16 RX ORDER — NAPROXEN 500 MG/1
500 TABLET ORAL ONCE
Status: COMPLETED | OUTPATIENT
Start: 2022-09-16 | End: 2022-09-16

## 2022-09-16 RX ORDER — VALACYCLOVIR HYDROCHLORIDE 500 MG/1
1000 TABLET, FILM COATED ORAL ONCE
Status: COMPLETED | OUTPATIENT
Start: 2022-09-16 | End: 2022-09-16

## 2022-09-16 RX ORDER — VALACYCLOVIR HYDROCHLORIDE 1 G/1
1000 TABLET, FILM COATED ORAL 2 TIMES DAILY
Qty: 20 TABLET | Refills: 0 | Status: SHIPPED | OUTPATIENT
Start: 2022-09-16 | End: 2022-09-26

## 2022-09-16 RX ORDER — DEXAMETHASONE 4 MG/1
6 TABLET ORAL ONCE
Status: COMPLETED | OUTPATIENT
Start: 2022-09-16 | End: 2022-09-16

## 2022-09-16 RX ORDER — NAPROXEN 500 MG/1
500 TABLET ORAL 2 TIMES DAILY WITH MEALS
Qty: 20 TABLET | Refills: 0 | Status: SHIPPED | OUTPATIENT
Start: 2022-09-16 | End: 2022-09-26

## 2022-09-16 RX ADMIN — DEXAMETHASONE 8 MG: 4 TABLET ORAL at 06:39

## 2022-09-16 RX ADMIN — VALACYCLOVIR HYDROCHLORIDE 1000 MG: 500 TABLET, FILM COATED ORAL at 05:45

## 2022-09-16 RX ADMIN — NAPROXEN 500 MG: 500 TABLET ORAL at 06:40

## 2022-09-16 ASSESSMENT — FIBROSIS 4 INDEX: FIB4 SCORE: 0.3

## 2022-09-16 NOTE — ED PROVIDER NOTES
ED Provider Note    CHIEF COMPLAINT  Chief Complaint   Patient presents with    Sore Throat     For 2 days, pt has been coughing bright red blood       HPI  Is a 22-year-old female with a past medical history of facial herpetic lesions, recent otitis media presents emergency room for worsening throat discomfort.  Is been going on for the better part of 3 to 5 days, has been associated with occasional irritation and cough that brings up sputum that occasionally has some streaks of blood.  She has not had any measured fevers but has had subjective fevers and chills, she has had some small lumps underneath her jaw has been somewhat painful to the touch and additionally reports a history of prior facial herpetic lesions and feels like she is having the mounting pain and discomfort that precedes this.  She has not had any vision changes, no temporal headaches, has already received 2 doses of amoxicillin prior to being seen here in the emergency department.  No significant voice changes, no difficulty handling her secretions.    PPE Note: I personally donned full PPE for all patient encounters during this visit, including being clean-shaven with an N95 respirator mask, gloves, and goggles.     REVIEW OF SYSTEMS  See HPI, all other review systems are negative.    PAST MEDICAL HISTORY   has a past medical history of Seizure (HCC).    SOCIAL HISTORY  Social History     Tobacco Use    Smoking status: Former     Packs/day: 0.00     Types: Cigarettes    Smokeless tobacco: Never    Tobacco comments:     occ- for stress   Vaping Use    Vaping Use: Never used   Substance and Sexual Activity    Alcohol use: No    Drug use: Not Currently    Sexual activity: Yes     Partners: Male     Birth control/protection: None     Comment: 1 partner       SURGICAL HISTORY  patient denies any surgical history    CURRENT MEDICATIONS  Home Medications       Reviewed by Zoya Toth R.N. (Registered Nurse) on 09/16/22 at 0400  [facility] List  "Status: Not Addressed     Medication Last Dose Status   amoxicillin (AMOXIL) 875 MG tablet  Active   clindamycin (CLEOCIN) 150 MG Cap  Active   levetiracetam (KEPPRA) 1000 MG tablet  Active   levETIRAcetam (KEPPRA) 500 MG Tab  Active                    ALLERGIES  No Known Allergies    PHYSICAL EXAM  VITAL SIGNS: /79   Pulse 93   Temp 37.7 °C (99.9 °F) (Oral)   Resp 16   Ht 1.448 m (4' 9\")   Wt 85 kg (187 lb 6.3 oz)   SpO2 99%   BMI 40.55 kg/m²    Pulse ox interpretation: I interpret this pulse ox as normal.  Genl: F sitting in chair comfortably, speaking clearly, appears in no acute distress   Head: NC/AT   ENT: Mucous membranes moist, posterior pharynx thematous with right-sided exudative changes on tonsillar pillars, no posterior bulging or anatomical abnormalities, uvula midline, nares patent bilaterally.  Bilateral TMs are visualized, there is no obstruction, there is no rupture, right side has slight erythema without bulging.  Eyes: Normal sclera, pupils equal round reactive to light  Neck: Supple, FROM, lateral submandibular lymphadenopathy is noted  Pulmonary: Lungs are clear to auscultation bilaterally  CV:  RRR, no murmur appreciated, pulses 2+ in both upper and lower extremities,  Abdomen: soft, NT/ND; no rebound/guarding  Musculoskeletal: Pain free ROM of the neck. Moving upper and lower extremities and spontaneous in coordinated fashion  Neuro: A&Ox4 (person, place, time, situation), speech fluent, gait steady, no focal deficits appreciated  Skin: Small punctate erythematous lesion with a single vesicles noted on the left inferior portion of the orbital rim.  There is no blepharitis, there is no purulent discharge at the lid margin, there is no pain with ocular movements or blinking.  No pallor or jaundice.     COURSE & MEDICAL DECISION MAKING  No orders to display     Labs Reviewed   GROUP A STREP BY PCR   HETEROPHILE AB SCREEN     Pertinent Labs & Imaging studies reviewed. (See chart for " details    MDM  Initial evaluation at 0509:  Seen and evaluated for symptoms as described above.  The patient is alert, oriented, has a recent otitis media and likely had some elements of viral etiology leading to pharyngitis and concurrent otitis media.  She has a history of herpetic facial lesions but does not have any ocular pain, no ocular lesions or signs of involvement in the tip of the naris.  There is no evidence of Clancy Sweet, there is no intractable headaches, overall strep pharyngitis considered and test pending in addition to monotest with some nonspecific body aches chills over the course of last several days but also over the last several weeks.   There is no signs of ocular involvement, she has a normal visual acuity at the bedside and was offered valacyclovir which she will continue over the next 5 to 7 days.  Strep test came back showing no evidence of strep infection.  Patient will continue her antibiotics, at this time with vesicular lesion but no acute evidence of corneal abnormalities patient can be treated with oral valacyclovir as  Coverage for any herpetic keratitis and herpetic lesions.  Overall patient is well-established, has outpatient follow-up, given very strict return precautions.  Discharged home in stable condition.    FINAL IMPRESSION  Visit Diagnoses     ICD-10-CM   1. Pharyngitis, unspecified etiology  J02.9   2. Vesicle of skin  R23.8     Electronically signed by: Yao Castrejon M.D., 9/16/2022 5:09 AM

## 2022-09-16 NOTE — ED TRIAGE NOTES
Joselin Natarajan  22 y.o.  Chief Complaint   Patient presents with    Sore Throat     For 2 days, pt has been coughing bright red blood     Ambulatory with steady gait for above, pt reporting a sore throat for 2 days with a fever on Tuesday. Seen recently at  for ear pain, dx with ear infection and on amoxicillin starting today. Pt reporting L eye pain as well. Pt sounds congested, denies CP and SOB, VSS, A&Ox4, placed back in lobby.

## 2022-09-16 NOTE — ED NOTES
Pt medicated per MAR.   8mg of dexamethasone (DECADRON) given due to being in 4mg tablets; approved by [facility].

## 2022-10-01 ENCOUNTER — HOSPITAL ENCOUNTER (EMERGENCY)
Facility: MEDICAL CENTER | Age: 22
End: 2022-10-01
Attending: EMERGENCY MEDICINE
Payer: MEDICAID

## 2022-10-01 VITALS
OXYGEN SATURATION: 97 % | DIASTOLIC BLOOD PRESSURE: 57 MMHG | HEART RATE: 113 BPM | WEIGHT: 185 LBS | SYSTOLIC BLOOD PRESSURE: 102 MMHG | BODY MASS INDEX: 39.91 KG/M2 | RESPIRATION RATE: 17 BRPM | HEIGHT: 57 IN | TEMPERATURE: 97.6 F

## 2022-10-01 DIAGNOSIS — R56.9 SEIZURE (HCC): ICD-10-CM

## 2022-10-01 LAB
ALBUMIN SERPL BCP-MCNC: 3.8 G/DL (ref 3.2–4.9)
ALBUMIN/GLOB SERPL: 1.2 G/DL
ALP SERPL-CCNC: 90 U/L (ref 30–99)
ALT SERPL-CCNC: 25 U/L (ref 2–50)
ANION GAP SERPL CALC-SCNC: 14 MMOL/L (ref 7–16)
AST SERPL-CCNC: 19 U/L (ref 12–45)
BILIRUB SERPL-MCNC: 0.2 MG/DL (ref 0.1–1.5)
BUN SERPL-MCNC: 8 MG/DL (ref 8–22)
CALCIUM SERPL-MCNC: 8.6 MG/DL (ref 8.5–10.5)
CHLORIDE SERPL-SCNC: 106 MMOL/L (ref 96–112)
CO2 SERPL-SCNC: 17 MMOL/L (ref 20–33)
CREAT SERPL-MCNC: 0.56 MG/DL (ref 0.5–1.4)
GFR SERPLBLD CREATININE-BSD FMLA CKD-EPI: 133 ML/MIN/1.73 M 2
GLOBULIN SER CALC-MCNC: 3.1 G/DL (ref 1.9–3.5)
GLUCOSE SERPL-MCNC: 116 MG/DL (ref 65–99)
HCG SERPL QL: NEGATIVE
POTASSIUM SERPL-SCNC: 3.9 MMOL/L (ref 3.6–5.5)
PROT SERPL-MCNC: 6.9 G/DL (ref 6–8.2)
SODIUM SERPL-SCNC: 137 MMOL/L (ref 135–145)

## 2022-10-01 PROCEDURE — 84703 CHORIONIC GONADOTROPIN ASSAY: CPT

## 2022-10-01 PROCEDURE — 96374 THER/PROPH/DIAG INJ IV PUSH: CPT

## 2022-10-01 PROCEDURE — 700111 HCHG RX REV CODE 636 W/ 250 OVERRIDE (IP): Performed by: EMERGENCY MEDICINE

## 2022-10-01 PROCEDURE — 80177 DRUG SCRN QUAN LEVETIRACETAM: CPT

## 2022-10-01 PROCEDURE — 99284 EMERGENCY DEPT VISIT MOD MDM: CPT

## 2022-10-01 PROCEDURE — 36415 COLL VENOUS BLD VENIPUNCTURE: CPT

## 2022-10-01 PROCEDURE — 80053 COMPREHEN METABOLIC PANEL: CPT

## 2022-10-01 RX ORDER — LEVETIRACETAM 500 MG/5ML
1000 INJECTION, SOLUTION, CONCENTRATE INTRAVENOUS ONCE
Status: COMPLETED | OUTPATIENT
Start: 2022-10-01 | End: 2022-10-01

## 2022-10-01 RX ADMIN — LEVETIRACETAM 1000 MG: 100 INJECTION, SOLUTION INTRAVENOUS at 11:47

## 2022-10-01 ASSESSMENT — FIBROSIS 4 INDEX: FIB4 SCORE: 0.3

## 2022-10-01 NOTE — ED PROVIDER NOTES
ED Provider Note    CHIEF COMPLAINT  Chief Complaint   Patient presents with    Seizure     Hx Epilepsy       HPI  Joselin Natarajan is a 22 y.o. female who presents for evaluation of seizure activity.  The patient has an established history of seizure disorder since adolescence.  She is followed by Dr. Ledezma.  She is on Keppra.  She admits to noncompliance over the last few days.  Unclear why but she did not run out of her medication.  She apparently had a grand mal seizure at home.  There is no report of any trauma.  She did bite her tongue but has no active hemorrhage.  She was postictal but when I evaluated the patient she was mentally clear without any focal neurological complaints such as numbness weakness tingling to the arms legs or face difficulty word finding headache or ataxia.    REVIEW OF SYSTEMS  See HPI for further details.  No high fevers chills night sweats weight loss all other systems are negative.     PAST MEDICAL HISTORY  Past Medical History:   Diagnosis Date    Seizure (HCC)     since 8 grade       FAMILY HISTORY  Noncontributory    SOCIAL HISTORY  Social History     Socioeconomic History    Marital status: Single   Tobacco Use    Smoking status: Former     Packs/day: 0.00     Types: Cigarettes    Smokeless tobacco: Never    Tobacco comments:     occ- for stress   Vaping Use    Vaping Use: Never used   Substance and Sexual Activity    Alcohol use: No    Drug use: Not Currently    Sexual activity: Yes     Partners: Male     Birth control/protection: None     Comment: 1 partner   Other Topics Concern    Behavioral problems No    Interpersonal relationships No    Sad or not enjoying activities No    Suicidal thoughts No    Poor school performance No    Reading difficulties No    Speech difficulties No    Writing difficulties No    Inadequate sleep No    Excessive TV viewing No    Excessive video game use No    Inadequate exercise Yes    Sports related No    Poor diet Yes    Family  "concerns for drug/alcohol abuse No    Poor oral hygiene No    Bike safety No    Family concerns vehicle safety No       SURGICAL HISTORY  No past surgical history on file.  Noncontributory  CURRENT MEDICATIONS  Home Medications       Reviewed by Sonam Draper R.N. (Registered Nurse) on 10/01/22 at 1123  Med List Status: Partial     Medication Last Dose Status   clindamycin (CLEOCIN) 150 MG Cap  Active   levetiracetam (KEPPRA) 1000 MG tablet  Active   levETIRAcetam (KEPPRA) 500 MG Tab  Active                    ALLERGIES  No Known Allergies    PHYSICAL EXAM  VITAL SIGNS: BP (!) 94/57   Pulse (!) 107   Temp 36.7 °C (98 °F) (Temporal)   Resp 20   Ht 1.448 m (4' 9\")   Wt 83.9 kg (185 lb)   LMP 09/01/2022 (Approximate)   SpO2 96%   BMI 40.03 kg/m²       Constitutional: Well developed, Well nourished, No acute distress, Non-toxic appearance.   HENT: Normocephalic, Atraumatic, Bilateral external ears normal, Oropharynx moist, No oral exudates, Nose normal.   Eyes: PERRLA, EOMI, Conjunctiva normal, No discharge.   Neck: Normal range of motion, No tenderness, Supple, No stridor.   Cardiovascular: Tachycardic, Normal rhythm, No murmurs, No rubs, No gallops.   Thorax & Lungs: Normal breath sounds, No respiratory distress, No wheezing, No chest tenderness.   Abdomen: Bowel sounds normal, Soft, No tenderness, No masses, No pulsatile masses.   Skin: Warm, Dry, No erythema, No rash.   Back: No tenderness, No CVA tenderness.   Extremities: Intact distal pulses, No edema, No tenderness, No cyanosis, No clubbing.   Neurologic: Alert & oriented x 3, Normal motor function, Normal sensory function, No focal deficits noted.   Psychiatric: Affect normal, Judgment normal, Mood normal.       COURSE & MEDICAL DECISION MAKING  Pertinent Labs & Imaging studies reviewed. (See chart for details)  Results for orders placed or performed during the hospital encounter of 10/01/22   Comp Metabolic Panel   Result Value Ref Range    Sodium 137 " 135 - 145 mmol/L    Potassium 3.9 3.6 - 5.5 mmol/L    Chloride 106 96 - 112 mmol/L    Co2 17 (L) 20 - 33 mmol/L    Anion Gap 14.0 7.0 - 16.0    Glucose 116 (H) 65 - 99 mg/dL    Bun 8 8 - 22 mg/dL    Creatinine 0.56 0.50 - 1.40 mg/dL    Calcium 8.6 8.5 - 10.5 mg/dL    AST(SGOT) 19 12 - 45 U/L    ALT(SGPT) 25 2 - 50 U/L    Alkaline Phosphatase 90 30 - 99 U/L    Total Bilirubin 0.2 0.1 - 1.5 mg/dL    Albumin 3.8 3.2 - 4.9 g/dL    Total Protein 6.9 6.0 - 8.2 g/dL    Globulin 3.1 1.9 - 3.5 g/dL    A-G Ratio 1.2 g/dL   HCG QUAL SERUM   Result Value Ref Range    Beta-Hcg Qualitative Serum Negative Negative   ESTIMATED GFR   Result Value Ref Range    GFR (CKD-EPI) 133 >60 mL/min/1.73 m 2     I reviewed the patient's records.  She has a well-established history of epileptic seizures.  She had a grand mal seizure earlier today but there is no clinical suggestion of status epilepticus.  Etiology of breakthrough seizure is very clearly due to noncompliance.  We will check a Keppra level for her neurology follow-up and we gave her 1000 mg IV load of Keppra.  We confirm that she does indeed have her prescription to simply forgot to take it.  She has a nonfocal neurological exam and neuroimaging is not clinically indicated.  Metabolic panel and pregnancy are normal.  Patient will be discharged in improved condition    FINAL IMPRESSION  1.  Acute seizure with history of seizure disorder  2.  Noncompliance with medical regimen causing #1      Electronically signed by: Heri Hastings M.D., 10/1/2022 11:48 AM

## 2022-10-01 NOTE — ED NOTES
Reviewed discharge instructions with patient and answered any questions. Patient discharged home and encouraged to follow up with Neurology. Patient ambulating with steady gait.

## 2022-10-01 NOTE — ED TRIAGE NOTES
"Chief Complaint   Patient presents with    Seizure     Hx Epilepsy     BIB EMS for above complaint. Per patient's  her postictal phase was lasting longer than normal. Patient reports forgetting to take her Keppra last night and has not taken it yet this morning. Patient reports last seizure was in January. A&O x4 at this time.     /64   Pulse (!) 121   Temp 36.7 °C (98 °F) (Temporal)   Resp 20   Ht 1.448 m (4' 9\")   Wt 83.9 kg (185 lb)   LMP 09/01/2022 (Approximate)   SpO2 94%   BMI 40.03 kg/m²     "

## 2022-10-03 LAB — LEVETIRACETAM SERPL-MCNC: <2 UG/ML (ref 10–40)

## 2022-12-12 ENCOUNTER — OFFICE VISIT (OUTPATIENT)
Dept: MEDICAL GROUP | Facility: MEDICAL CENTER | Age: 22
End: 2022-12-12
Attending: NURSE PRACTITIONER
Payer: MEDICAID

## 2022-12-12 VITALS
HEIGHT: 57 IN | DIASTOLIC BLOOD PRESSURE: 78 MMHG | RESPIRATION RATE: 19 BRPM | WEIGHT: 193.3 LBS | HEART RATE: 63 BPM | TEMPERATURE: 97.1 F | BODY MASS INDEX: 41.7 KG/M2 | OXYGEN SATURATION: 99 % | SYSTOLIC BLOOD PRESSURE: 108 MMHG

## 2022-12-12 DIAGNOSIS — M26.629 TMJ SYNDROME: ICD-10-CM

## 2022-12-12 PROCEDURE — 99213 OFFICE O/P EST LOW 20 MIN: CPT | Performed by: NURSE PRACTITIONER

## 2022-12-12 PROCEDURE — 99214 OFFICE O/P EST MOD 30 MIN: CPT | Performed by: NURSE PRACTITIONER

## 2022-12-12 ASSESSMENT — FIBROSIS 4 INDEX: FIB4 SCORE: 0.26

## 2022-12-12 ASSESSMENT — PATIENT HEALTH QUESTIONNAIRE - PHQ9: CLINICAL INTERPRETATION OF PHQ2 SCORE: 0

## 2022-12-12 NOTE — PROGRESS NOTES
"No chief complaint on file.      Subjective:     HPI:   Joselin Natarajan is a 22 y.o. female here to discuss the evaluation and management of:      Problem   TMJ Syndrome    Patient here with complaints of popping in the upper portion of her jaw as well as uncontrollable closure of her mouth when she tries to open it too wide.  Patient states has been going on for quite some time but that it is causing pain.         ROS  See HPI     No Known Allergies    Current medicines (including changes today)  Current Outpatient Medications   Medication Sig Dispense Refill    levetiracetam (KEPPRA) 1000 MG tablet       levETIRAcetam (KEPPRA) 500 MG Tab Take 1,000 mg by mouth 2 times a day.      clindamycin (CLEOCIN) 150 MG Cap TAKE 2 TABLETS BY MOUTH EVERY 8 HOURS UNITL GONE       No current facility-administered medications for this visit.       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 - last seizure 7/17 when meds missed, Neruology appt 8/2 wnl, no f/u 06/05/2018    PTSD (post-traumatic stress disorder) 06/05/2018       Family History   Problem Relation Age of Onset    Hypertension Mother     Lung Disease Mother         asthma    No Known Problems Father     No Known Problems Sister     Cancer Maternal Grandmother         breast          Objective:     /78 (BP Location: Left arm, Patient Position: Sitting, BP Cuff Size: Adult)   Pulse 63   Temp 36.2 °C (97.1 °F) (Temporal)   Resp 19   Ht 1.448 m (4' 9\")   Wt 87.7 kg (193 lb 4.8 oz)   SpO2 99%  Body mass index is 41.83 " kg/m².    Physical Exam:  Physical Exam  Vitals reviewed.   Constitutional:       General: She is awake.      Appearance: Normal appearance. She is well-developed.   HENT:      Head: Normocephalic.   Eyes:      Conjunctiva/sclera: Conjunctivae normal.   Cardiovascular:      Rate and Rhythm: Normal rate.   Pulmonary:      Effort: Pulmonary effort is normal. No respiratory distress.   Musculoskeletal:      Cervical back: Neck supple.   Skin:     General: Skin is warm and dry.   Neurological:      Mental Status: She is alert and oriented to person, place, and time.   Psychiatric:         Mood and Affect: Mood normal.         Behavior: Behavior normal. Behavior is cooperative.            Assessment and Plan:     The following treatment plan was discussed:    Problem List Items Addressed This Visit       TMJ syndrome     Ongoing-  Will refer to [facility] for help with treatment or interventions  Provided patient information regarding TMJ  Encouraged her to use ibuprofen as needed to help with pain         Relevant Orders    Referral to Oral Maxillofacial Surgery       Any change or worsening of signs or symptoms, patient encouraged to follow-up or report to emergency room for further evaluation. Patient verbalizes understanding and agrees.    Follow-Up: As needed      PLEASE NOTE: This dictation was created using voice recognition software. I have made every reasonable attempt to correct obvious errors, but I expect that there are errors of grammar and possibly content that I did not discover before finalizing the note.

## 2022-12-12 NOTE — ASSESSMENT & PLAN NOTE
Ongoing-  Will refer to [facility] for help with treatment or interventions  Provided patient information regarding TMJ  Encouraged her to use ibuprofen as needed to help with pain

## 2023-01-17 ENCOUNTER — HOSPITAL ENCOUNTER (OUTPATIENT)
Facility: MEDICAL CENTER | Age: 23
End: 2023-01-17
Attending: STUDENT IN AN ORGANIZED HEALTH CARE EDUCATION/TRAINING PROGRAM
Payer: MEDICAID

## 2023-01-17 ENCOUNTER — GYNECOLOGY VISIT (OUTPATIENT)
Dept: OBGYN | Facility: CLINIC | Age: 23
End: 2023-01-17
Payer: MEDICAID

## 2023-01-17 VITALS — BODY MASS INDEX: 41.98 KG/M2 | WEIGHT: 194 LBS | SYSTOLIC BLOOD PRESSURE: 125 MMHG | DIASTOLIC BLOOD PRESSURE: 80 MMHG

## 2023-01-17 DIAGNOSIS — Z12.4 CERVICAL CANCER SCREENING: ICD-10-CM

## 2023-01-17 DIAGNOSIS — Z30.430 ENCOUNTER FOR INSERTION OF MIRENA IUD: ICD-10-CM

## 2023-01-17 DIAGNOSIS — Z01.419 WELL WOMAN EXAM WITH ROUTINE GYNECOLOGICAL EXAM: ICD-10-CM

## 2023-01-17 LAB
INT CON NEG: NEGATIVE
INT CON POS: POSITIVE
POC URINE PREGNANCY TEST: NEGATIVE

## 2023-01-17 PROCEDURE — 88175 CYTOPATH C/V AUTO FLUID REDO: CPT

## 2023-01-17 PROCEDURE — 58300 INSERT INTRAUTERINE DEVICE: CPT | Performed by: OBSTETRICS & GYNECOLOGY

## 2023-01-17 PROCEDURE — 87491 CHLMYD TRACH DNA AMP PROBE: CPT

## 2023-01-17 PROCEDURE — 81025 URINE PREGNANCY TEST: CPT | Performed by: OBSTETRICS & GYNECOLOGY

## 2023-01-17 PROCEDURE — 87591 N.GONORRHOEAE DNA AMP PROB: CPT

## 2023-01-17 RX ORDER — LEVETIRACETAM 750 MG/1
750 TABLET ORAL 2 TIMES DAILY
COMMUNITY
Start: 2022-12-21

## 2023-01-17 ASSESSMENT — FIBROSIS 4 INDEX: FIB4 SCORE: 0.26

## 2023-01-17 NOTE — PROGRESS NOTES
GYN F/U    Cc: IUD placement    HPI: Joselin Stephens is a 22 y.o.  here for IUD placement.     Has had Mirena IUD in past, and was happy with contraceptive protection. She has been using barrier contraception for the past year after her pregnancy (). She no longer breastfeeds. Last sexual encounter was approximate 1-2 weeks ago, utilized barrier contraception.  LMP was , lasted 5 days. Cycle can be 20-30 days apart. No recent spotting/vaginal bleeding. No dysuria, vaginal/abdominal pain, no recent sickness, fever/chills.    Search of [employer] confirms a completed HPV vaccine series.     ROS:  Gen: denies fevers, general concerns  Abd: denies abdominal pain  Gu: denies vaginal bleeding, discharge, pain    Past Medical History:   Diagnosis Date    Seizure (HCC)     since 8 grade       /80 (BP Location: Right arm, Patient Position: Sitting)   Wt 194 lb   LMP 2022   BMI 41.98 kg/m²   Gen; AAO, NAD  Gu: External vulvar structures normal, withOUT lesion, sores, or wounds. Speculum exam demonstrates normal vaginal rugae, cervical os closed, no discharge/bleeding. No cervical or vaginal lesion or tissue discolouration.    Joselin Stephens is a 22  presenting for IUD placement.    #encounter for long-acting contraception  Desires long-term contraception. Previous IUD, which was effective for her in past. Discussed risks/benefits, she would like to proceed with IUD placement today.  -Consented today for Mirena placement  -Ibuprofen 200-400mg PO q6h/PRN  -counseled on potential red-flag concerns such as new abdominal pain, fever/chills, dizziness, shortness of breath  -Follow up 4 weeks for string/IUD check    #encounter for well woman with exam  Sexually active with 1 male partner. 2 live children. Previous breastfeeding. Denies tobacco or substance use. Denies concerns of abuse. 3 days out from age 23, offered screening PAP today. HPV vax series completed, per [employer] review. No  additional health concerns were raised today.  -PAP with cytology, ordered  -Follow up results in 4 weeks at IUD change, likely to message with results prior to this visit.      Mirena insertion:  Written consent was obtained from Joselin Stephens. Risks and benefits were discussed, and all questions answered prior to procedure.    A no touch technique was used throughout the procedure. A speculum was placed into vagina and cervix was swabbed with betadine. A metal sound was advanced through the external and internal os until it reached the fundus of the uterus, the depth was 8 cm.  The sound was then withdrawn. The IUD was loaded in a sterile manner and advanced into position. The string was visualized and cut to 3cm. Patient tolerated the procedure well.   The patient verbalized understanding that the IUD does not protect against STI's. The patient should use condoms for STI protection. Patient was instructed to return as needed for follow up care. I advised them to return sooner if any fever, pelvic pain or abnormal discharge developed. They clearly verbalized understanding of these instructions. I instructed them to take pain medication as needed and that their first few menstrual cycles may be heavier than usual. They were given a detailed instruction sheet about their IUD.    NDC: 90273-535-22  LOT# [id number], exp. 12/2024      F/U: 4 weeks for IUD strings/placement check.

## 2023-01-18 LAB
C TRACH DNA GENITAL QL NAA+PROBE: NEGATIVE
CYTOLOGY REG CYTOL: NORMAL
N GONORRHOEA DNA GENITAL QL NAA+PROBE: NEGATIVE
SPECIMEN SOURCE: NORMAL

## 2023-02-14 ENCOUNTER — GYNECOLOGY VISIT (OUTPATIENT)
Dept: OBGYN | Facility: CLINIC | Age: 23
End: 2023-02-14
Payer: MEDICAID

## 2023-02-14 VITALS — WEIGHT: 196 LBS | BODY MASS INDEX: 42.41 KG/M2 | SYSTOLIC BLOOD PRESSURE: 110 MMHG | DIASTOLIC BLOOD PRESSURE: 72 MMHG

## 2023-02-14 DIAGNOSIS — Z30.431 IUD CHECK UP: ICD-10-CM

## 2023-02-14 PROCEDURE — 99212 OFFICE O/P EST SF 10 MIN: CPT | Mod: GC | Performed by: OBSTETRICS & GYNECOLOGY

## 2023-02-14 ASSESSMENT — FIBROSIS 4 INDEX: FIB4 SCORE: 0.28

## 2023-02-14 NOTE — PROGRESS NOTES
S: This is a 23 year old  who is s/p IUD placement on 2023.  LMP 2022.  She has had some spotting and cramping since the insertion, but currently has no complaints. Denies pelvic pain, heavy vaginal bleeding, or abnormal vaginal discharge.     O: There were no vitals filed for this visit.      GENERAL: Alert, in no apparent distress  PSYCHIATRIC: Appropriate affect, intact insight and judgement.  ABDOMEN: Soft, nontender, nondistended.  No palpable masses.  No rebound or guarding.  No inguinal lymphadenopathy.  No hepatosplenomegaly.  No hernias.    GENITOURINARY:  Normal external genitalia, no lesions.  Normal urethral meatus, no masses or tenderness.  Normal bladder without fullness or masses.  Vagina well estrogenized, no vaginal discharge or lesions.  Cervix without lesions or discharge, nontender. IUD string present at cervical os.   Uterus normal size, shape, and contour, nontender.  Adnexa nontender, no masses.    Brief T-Vag US reveals intrauterine IUD with shadowing in the fundus conferring appropriate placement. No free fluid identified.     ASSESSMENT: s/p Mirena  IUD Placement, in proper position.  No side effects.      PLAN: F/U PRN

## 2023-04-10 ENCOUNTER — HOSPITAL ENCOUNTER (EMERGENCY)
Facility: MEDICAL CENTER | Age: 23
End: 2023-04-10
Attending: EMERGENCY MEDICINE
Payer: MEDICAID

## 2023-04-10 ENCOUNTER — APPOINTMENT (OUTPATIENT)
Dept: RADIOLOGY | Facility: MEDICAL CENTER | Age: 23
End: 2023-04-10
Attending: EMERGENCY MEDICINE
Payer: MEDICAID

## 2023-04-10 VITALS
RESPIRATION RATE: 16 BRPM | DIASTOLIC BLOOD PRESSURE: 60 MMHG | BODY MASS INDEX: 43.47 KG/M2 | OXYGEN SATURATION: 98 % | HEIGHT: 57 IN | HEART RATE: 65 BPM | TEMPERATURE: 97.4 F | WEIGHT: 201.5 LBS | SYSTOLIC BLOOD PRESSURE: 104 MMHG

## 2023-04-10 DIAGNOSIS — K80.50 BILIARY COLIC: ICD-10-CM

## 2023-04-10 LAB
ALBUMIN SERPL BCP-MCNC: 4 G/DL (ref 3.2–4.9)
ALBUMIN/GLOB SERPL: 1.1 G/DL
ALP SERPL-CCNC: 111 U/L (ref 30–99)
ALT SERPL-CCNC: 32 U/L (ref 2–50)
ANION GAP SERPL CALC-SCNC: 12 MMOL/L (ref 7–16)
AST SERPL-CCNC: 24 U/L (ref 12–45)
BASOPHILS # BLD AUTO: 0.4 % (ref 0–1.8)
BASOPHILS # BLD: 0.03 K/UL (ref 0–0.12)
BILIRUB SERPL-MCNC: 0.3 MG/DL (ref 0.1–1.5)
BUN SERPL-MCNC: 10 MG/DL (ref 8–22)
CALCIUM ALBUM COR SERPL-MCNC: 8.7 MG/DL (ref 8.5–10.5)
CALCIUM SERPL-MCNC: 8.7 MG/DL (ref 8.5–10.5)
CHLORIDE SERPL-SCNC: 107 MMOL/L (ref 96–112)
CO2 SERPL-SCNC: 19 MMOL/L (ref 20–33)
CREAT SERPL-MCNC: 0.53 MG/DL (ref 0.5–1.4)
EKG IMPRESSION: NORMAL
EOSINOPHIL # BLD AUTO: 0.14 K/UL (ref 0–0.51)
EOSINOPHIL NFR BLD: 1.7 % (ref 0–6.9)
ERYTHROCYTE [DISTWIDTH] IN BLOOD BY AUTOMATED COUNT: 39.3 FL (ref 35.9–50)
GFR SERPLBLD CREATININE-BSD FMLA CKD-EPI: 135 ML/MIN/1.73 M 2
GLOBULIN SER CALC-MCNC: 3.8 G/DL (ref 1.9–3.5)
GLUCOSE SERPL-MCNC: 90 MG/DL (ref 65–99)
HCT VFR BLD AUTO: 41.8 % (ref 37–47)
HGB BLD-MCNC: 13.5 G/DL (ref 12–16)
IMM GRANULOCYTES # BLD AUTO: 0.06 K/UL (ref 0–0.11)
IMM GRANULOCYTES NFR BLD AUTO: 0.7 % (ref 0–0.9)
LIPASE SERPL-CCNC: 20 U/L (ref 11–82)
LYMPHOCYTES # BLD AUTO: 2.53 K/UL (ref 1–4.8)
LYMPHOCYTES NFR BLD: 29.8 % (ref 22–41)
MCH RBC QN AUTO: 26.8 PG (ref 27–33)
MCHC RBC AUTO-ENTMCNC: 32.3 G/DL (ref 33.6–35)
MCV RBC AUTO: 83.1 FL (ref 81.4–97.8)
MONOCYTES # BLD AUTO: 0.43 K/UL (ref 0–0.85)
MONOCYTES NFR BLD AUTO: 5.1 % (ref 0–13.4)
NEUTROPHILS # BLD AUTO: 5.29 K/UL (ref 2–7.15)
NEUTROPHILS NFR BLD: 62.3 % (ref 44–72)
NRBC # BLD AUTO: 0 K/UL
NRBC BLD-RTO: 0 /100 WBC
PLATELET # BLD AUTO: 312 K/UL (ref 164–446)
PMV BLD AUTO: 10.1 FL (ref 9–12.9)
POTASSIUM SERPL-SCNC: 3.9 MMOL/L (ref 3.6–5.5)
PROT SERPL-MCNC: 7.8 G/DL (ref 6–8.2)
RBC # BLD AUTO: 5.03 M/UL (ref 4.2–5.4)
SODIUM SERPL-SCNC: 138 MMOL/L (ref 135–145)
TROPONIN T SERPL-MCNC: <6 NG/L (ref 6–19)
WBC # BLD AUTO: 8.5 K/UL (ref 4.8–10.8)

## 2023-04-10 PROCEDURE — 84484 ASSAY OF TROPONIN QUANT: CPT

## 2023-04-10 PROCEDURE — 36415 COLL VENOUS BLD VENIPUNCTURE: CPT

## 2023-04-10 PROCEDURE — 93005 ELECTROCARDIOGRAM TRACING: CPT

## 2023-04-10 PROCEDURE — 76705 ECHO EXAM OF ABDOMEN: CPT

## 2023-04-10 PROCEDURE — 99284 EMERGENCY DEPT VISIT MOD MDM: CPT

## 2023-04-10 PROCEDURE — 93005 ELECTROCARDIOGRAM TRACING: CPT | Performed by: EMERGENCY MEDICINE

## 2023-04-10 PROCEDURE — 85025 COMPLETE CBC W/AUTO DIFF WBC: CPT

## 2023-04-10 PROCEDURE — 96374 THER/PROPH/DIAG INJ IV PUSH: CPT

## 2023-04-10 PROCEDURE — 96375 TX/PRO/DX INJ NEW DRUG ADDON: CPT

## 2023-04-10 PROCEDURE — 80053 COMPREHEN METABOLIC PANEL: CPT

## 2023-04-10 PROCEDURE — 700111 HCHG RX REV CODE 636 W/ 250 OVERRIDE (IP): Performed by: EMERGENCY MEDICINE

## 2023-04-10 PROCEDURE — 83690 ASSAY OF LIPASE: CPT

## 2023-04-10 RX ORDER — ONDANSETRON 2 MG/ML
4 INJECTION INTRAMUSCULAR; INTRAVENOUS ONCE
Status: COMPLETED | OUTPATIENT
Start: 2023-04-10 | End: 2023-04-10

## 2023-04-10 RX ORDER — ONDANSETRON 4 MG/1
4 TABLET, ORALLY DISINTEGRATING ORAL EVERY 6 HOURS PRN
Qty: 15 TABLET | Refills: 0 | Status: SHIPPED | OUTPATIENT
Start: 2023-04-10 | End: 2023-06-20

## 2023-04-10 RX ORDER — MORPHINE SULFATE 4 MG/ML
4 INJECTION INTRAVENOUS ONCE
Status: COMPLETED | OUTPATIENT
Start: 2023-04-10 | End: 2023-04-10

## 2023-04-10 RX ORDER — HYDROCODONE BITARTRATE AND ACETAMINOPHEN 5; 325 MG/1; MG/1
1 TABLET ORAL EVERY 4 HOURS PRN
Qty: 20 TABLET | Refills: 0 | Status: SHIPPED | OUTPATIENT
Start: 2023-04-10 | End: 2023-04-17

## 2023-04-10 RX ADMIN — ONDANSETRON 4 MG: 2 INJECTION INTRAMUSCULAR; INTRAVENOUS at 14:27

## 2023-04-10 RX ADMIN — MORPHINE SULFATE 4 MG: 4 INJECTION INTRAVENOUS at 14:29

## 2023-04-10 ASSESSMENT — FIBROSIS 4 INDEX: FIB4 SCORE: 0.28

## 2023-04-10 ASSESSMENT — PAIN DESCRIPTION - PAIN TYPE
TYPE: ACUTE PAIN
TYPE: ACUTE PAIN

## 2023-04-10 NOTE — ED PROVIDER NOTES
ER Provider Note    Scribed for Bry Peter M.d. by Rosemarie Bowie. 4/10/2023  1:23 PM    Primary Care Provider: ROSS Nuñez    CHIEF COMPLAINT  Chief Complaint   Patient presents with    Chest Pain     Patient reports chest pain that started at approx 0700 today. Patient reports the pain radiates to her back. Patient states the pain is constant. Patient also reports associated nausea.     EXTERNAL RECORDS REVIEWED  PDMP Patient's narcotic score is zero.    HPI/ROS  LIMITATION TO HISTORY   Select: : None    OUTSIDE HISTORIAN(S):  None    Joselin Natarajan is a 23 y.o. female who presents to the ED complaining of intermittent chest pain onset at approximately 7 AM this morning. She describes the pain as feeling tight with occasional sharp pain.  She describes the pain as radiating to the back. She endorses associated symptoms of abdominal pain and nausea. Patient denies any fever, vomiting, or diarrhea. She reports no history of acid reflux. There are no known alleviating or exacerbating factors. Patient also denies any previous surgical history.     PAST MEDICAL HISTORY  Past Medical History:   Diagnosis Date    Seizure (HCC)     since 8 grade       SURGICAL HISTORY  History reviewed. No pertinent surgical history.    FAMILY HISTORY  Family History   Problem Relation Age of Onset    Hypertension Mother     Lung Disease Mother         asthma    No Known Problems Father     No Known Problems Sister     Cancer Maternal Grandmother         breast       SOCIAL HISTORY   reports that she has quit smoking. Her smoking use included cigarettes. She has never used smokeless tobacco. She reports that she does not currently use drugs. She reports that she does not drink alcohol.    CURRENT MEDICATIONS  Previous Medications    CLINDAMYCIN (CLEOCIN) 150 MG CAP    TAKE 2 TABLETS BY MOUTH EVERY 8 HOURS UNITL GONE    LEVETIRACETAM (KEPPRA) 1000 MG TABLET        LEVETIRACETAM (KEPPRA) 500 MG TAB    Take  "1,000 mg by mouth 2 times a day.    LEVETIRACETAM (KEPPRA) 750 MG TABLET    Take 750 mg by mouth 2 times a day.       ALLERGIES  Patient has no known allergies.    PHYSICAL EXAM  /76   Pulse 72   Temp 36.3 °C (97.4 °F) (Temporal)   Resp 16   Ht 1.448 m (4' 9\")   Wt 91.4 kg (201 lb 8 oz)   SpO2 99%   BMI 43.60 kg/m²     Constitutional: Well developed, Well nourished, No acute distress, Non-toxic appearance.   HENT: Normocephalic, Atraumatic, Bilateral external ears normal, Oropharynx is clear mucous membranes are moist. No oral exudates or nasal discharge.   Eyes: Pupils are equal round and reactive, EOMI, Conjunctiva normal, No discharge.   Neck: Normal range of motion, No tenderness, Supple, No stridor. No meningismus.  Lymphatic: No lymphadenopathy noted.   Cardiovascular: Regular rate and rhythm without murmur rub or gallop.  Thorax & Lungs: Clear breath sounds bilaterally without wheezes, rhonchi or rales. There is no chest wall tenderness.   Abdomen: Soft non-distended. There is no rebound or guarding. No organomegaly is appreciated. Bowel sounds are normal. Right upper quadrant abdominal and epigastric pain and tendernes   Skin: Normal without rash.   Back: No CVA or spinal tenderness.   Extremities: Intact distal pulses, No edema, No tenderness, No cyanosis, No clubbing. Capillary refill is less than 2 seconds.  Musculoskeletal: Good range of motion in all major joints. No tenderness to palpation or major deformities noted.   Neurologic: Alert & oriented x 3, Normal motor function, Normal sensory function, No focal deficits noted. Reflexes are normal.  Psychiatric: Affect normal, Judgment normal, Mood normal. There is no suicidal ideation or patient reported hallucinations.     DIAGNOSTIC STUDIES    Labs:   Labs Reviewed   CBC WITH DIFFERENTIAL - Abnormal; Notable for the following components:       Result Value    MCH 26.8 (*)     MCHC 32.3 (*)     All other components within normal limits    " Narrative:     Biotin intake of greater than 5 mg per day may interfere with  troponin levels, causing false low values.   COMP METABOLIC PANEL - Abnormal; Notable for the following components:    Co2 19 (*)     Alkaline Phosphatase 111 (*)     Globulin 3.8 (*)     All other components within normal limits    Narrative:     Biotin intake of greater than 5 mg per day may interfere with  troponin levels, causing false low values.   LIPASE    Narrative:     Biotin intake of greater than 5 mg per day may interfere with  troponin levels, causing false low values.   TROPONIN    Narrative:     Biotin intake of greater than 5 mg per day may interfere with  troponin levels, causing false low values.   CORRECTED CALCIUM    Narrative:     Biotin intake of greater than 5 mg per day may interfere with  troponin levels, causing false low values.   ESTIMATED GFR    Narrative:     Biotin intake of greater than 5 mg per day may interfere with  troponin levels, causing false low values.     EKG:   I have independently interpreted this EKG  Results for orders placed or performed during the hospital encounter of 04/10/23   EKG   Result Value Ref Range    Report       [facility] Emergency Dept[facility]    Test Date:  2023-04-10  Pt Name:    ANKUR BHAKTA          Department: ER  MRN:        [id number]                      Room:  Gender:     Female                       Technician: [id number]  :        2000                   Requested By:ER TRIAGE PROTOCOL  Order #:    [id number]                    Reading MD: MARY NEVAREZ MD    Measurements  Intervals                                Axis  Rate:       54                           P:          13  FL:         128                          QRS:        25  QRSD:       85                           T:          13  QT:         432  QTc:        410    Interpretive Statements  Sinus bradycardia  No previous ECG available for comparison  Electronically Signed On 4- 13:30:58  PDT by MARY NEVAREZ MD         Radiology:   The attending emergency physician has independently interpreted the diagnostic imaging associated with this visit and am waiting the final reading from the radiologist.   Preliminary interpretation is a follows: No gallstones  Radiologist interpretation:   US-RUQ   Final Result      1.  Findings suggest acute cholecystitis.   2.  No biliary dilation.   3.  Limited evaluation of the pancreas.        COURSE & MEDICAL DECISION MAKING     ED Observation Status? Yes; I am placing the patient in to an observation status due to a diagnostic uncertainty as well as therapeutic intensity. Patient placed in observation status at 1:30 PM, 4/10/2023.     Observation plan is as follows: Imaging and labs     Upon Reevaluation, the patient's condition has: Improved; and will be discharged.    Patient discharged from ED Observation status at 4:25 PM 4/10/2023.     INITIAL ASSESSMENT, COURSE AND PLAN  Care Narrative: Patient presents as chest pain however physical exam and further coronary indicates that she has abdominal pain in the epigastric and right upper quadrant that radiates to her back at times and she is food sensitive stating that the pain is worse after eating especially meats.  I doubt GERD and favor biliary colic and given that she does not have any tachycardia or fever I doubt acute cholecystitis    1:23 PM - Patient seen and examined at bedside. Discussed plan of care, including labs and imaging. Patient agrees to the plan of care. The patient will be medicated with Zofran injection 4 mg and Morphine injection 4 mg. Ordered for diagnostic studies to evaluate her symptoms.     Laboratory evaluation reveals no leukocytosis, shift, anemia, electrolyte derangements or acidosis no evidence of biliary tree obstruction.    3:46 PM - Patient was reevaluated at bedside. She reports being pain free as of now.     Gallbladder ultrasound reveals no gallstones but evidence of possible  acute cholecystitis on ultrasound however clinically this does not correlate.  She is nontender on repeat examination and is pain-free after morphine and I think this is biliary colic and feel she can be seen as an outpatient for possible elective cholecystectomy    4:05 PM - Paged general surgery.     4:10 PM I discussed the patient's case and the above findings with Dr. Nelson (General Surgery) who will follow up with the patient as an outpatient.     4:27 PM - I discussed plan for discharge and follow up as outlined below. The patient is stable for discharge at this time and will return for any new or worsening symptoms. Patient verbalizes understanding and support with my plan for discharge.     ADDITIONAL PROBLEM LIST  Obesity.  Reduce calorie intake.  Fat-free diet until cleared by surgery    DISPOSITION AND DISCUSSIONS  I have discussed management of the patient with the following physicians and YADY's:  Dr. Nelson (General Surgery)     Discussion of management with other [facility] or appropriate source(s): None     Escalation of care considered, and ultimately not performed: acute inpatient care management, however at this time, the patient is most appropriate for outpatient management.  I believe the patient can be managed as an outpatient for elective cholecystectomy    I reviewed prescription monitoring program for patient's narcotic use before prescribing a scheduled drug.The patient will not drink alcohol nor drive with prescribed medications. The patient will return for new or worsening symptoms and is stable at the time of discharge.    The patient is referred to a primary physician for blood pressure management, diabetic screening, and for all other preventative health concerns.    In prescribing controlled substances to this patient, I certify that I have obtained and reviewed the medical history of Joselin Lilia Natarajan. I have also made a good preeti effort to obtain applicable records from other  providers who have treated the patient and records did not demonstrate any increased risk of substance abuse that would prevent me from prescribing controlled substances.     I have conducted a physical exam and documented it. I have reviewed Ms. Ilir Natarajan’s prescription history as maintained by the [place] Prescription Monitoring Program.     I have assessed the patient’s risk for abuse, dependency, and addiction using the validated Opioid Risk Tool available at https://www.mdcalc.com/indlwx-rjcl-quao-ort-narcotic-abuse.     Given the above, I believe the benefits of controlled substance therapy outweigh the risks. The reasons for prescribing controlled substances include non-narcotic, oral analgesic alternatives have been inadequate for pain control. Accordingly, I have discussed the risk and benefits, treatment plan, and alternative therapies with the patient.       DISPOSITION:  Patient will be discharged home in stable condition.    FOLLOW UP:  Patrick Nelson M.D.  [address] [place] [id number]  [phone]    Schedule an appointment as soon as possible for a visit         OUTPATIENT MEDICATIONS:  New Prescriptions    HYDROCODONE-ACETAMINOPHEN (NORCO) 5-325 MG TAB PER TABLET    Take 1 Tablet by mouth every four hours as needed (pain) for up to 7 days.    ONDANSETRON (ZOFRAN ODT) 4 MG TABLET DISPERSIBLE    Take 1 Tablet by mouth every 6 hours as needed for Nausea/Vomiting.         FINAL DIANGOSIS  1. Biliary colic         Rosemarie NEAL (Kait), am scribing for, and in the presence of, Bry Peter M.D..    Electronically signed by: Rosemarie Ruiz), 4/10/2023    Bry NEAL M.D. personally performed the services described in this documentation, as scribed by Rosemarie Bowie in my presence, and it is both accurate and complete.       The note accurately reflects work and decisions made by me.  Bry Peter M.D.  4/10/2023  4:32 PM

## 2023-04-10 NOTE — ED NOTES
Pt educated regarding discharge instructions, including narcotics agreement, and demonstrated understanding. Pt ambulatory upon discharge and does not appear to be in any distress at this time. Pt's discharge paperwork and belongings sent home with pt.

## 2023-04-10 NOTE — ED TRIAGE NOTES
"Chief Complaint   Patient presents with    Chest Pain     Patient reports chest pain that started at approx 0700 today. Patient reports the pain radiates to her back. Patient states the pain is constant. Patient also reports associated nausea.       22 yo female to triage for above complaint. Patient reports pain and nausea started at approx 0700 today.  EKG complete.    Pt is alert and oriented, speaking in full sentences, follows commands and responds appropriately to questions.     Patient placed back in lobby and educated on triage process. Asked to inform RN of any changes.    /84   Pulse 72   Temp 36.3 °C (97.4 °F) (Temporal)   Resp 16   Ht 1.448 m (4' 9\")   Wt 91.4 kg (201 lb 8 oz)   SpO2 99%   BMI 43.60 kg/m²     "

## 2023-04-10 NOTE — ED NOTES
Pt sitting up in bed with even and unlabored breaths. No distress noted at this time and pt states pain is now 0/10. Pt updated regarding plan of care and demonstrated understanding. Will continue to monitor pt while awaiting further orders.

## 2023-04-10 NOTE — ED NOTES
PIV placed, blood drawn and sent to lab. Pt also medicated per MAR for 5/10 pain in epigastric region. Will continue to monitor.

## 2023-04-10 NOTE — DISCHARGE INSTRUCTIONS
Norco for pain as needed  Avoid any fats in your diet  See Dr. Giradlo to discuss removal of your gallbladder

## 2023-05-19 ENCOUNTER — APPOINTMENT (OUTPATIENT)
Dept: ADMISSIONS | Facility: MEDICAL CENTER | Age: 23
End: 2023-05-19
Attending: SURGERY
Payer: MEDICAID

## 2023-06-07 ENCOUNTER — APPOINTMENT (OUTPATIENT)
Dept: ADMISSIONS | Facility: MEDICAL CENTER | Age: 23
End: 2023-06-07
Attending: SURGERY
Payer: MEDICAID

## 2023-06-20 ENCOUNTER — PRE-ADMISSION TESTING (OUTPATIENT)
Dept: ADMISSIONS | Facility: MEDICAL CENTER | Age: 23
End: 2023-06-20
Attending: SURGERY
Payer: MEDICAID

## 2023-06-20 VITALS — HEIGHT: 57 IN | BODY MASS INDEX: 43.6 KG/M2

## 2023-06-20 NOTE — PREPROCEDURE INSTRUCTIONS
"Preadmit Phone appointment: \" Preparing for your Procedure information\" Instructions discussed with Patient.       Patient instructed to continue prescribed medications through the day before surgery, instructed to take the following medications the day of surgery per anesthesia protocol: Keppra.        Pt states, \"no issues with anesthesia\".  Fasting guidelines discussed with Patient, patient will follow MD's instructions for Pre-Surgery Diet.      All Pt's questions and concerns answered or addressed.  Emailed all instructions.    "

## 2023-06-28 ENCOUNTER — PRE-ADMISSION TESTING (OUTPATIENT)
Dept: ADMISSIONS | Facility: MEDICAL CENTER | Age: 23
End: 2023-06-28
Attending: SURGERY
Payer: MEDICAID

## 2023-06-28 DIAGNOSIS — Z01.812 PRE-OPERATIVE LABORATORY EXAMINATION: ICD-10-CM

## 2023-06-28 LAB
ANION GAP SERPL CALC-SCNC: 11 MMOL/L (ref 7–16)
BUN SERPL-MCNC: 6 MG/DL (ref 8–22)
CALCIUM SERPL-MCNC: 9.1 MG/DL (ref 8.4–10.2)
CHLORIDE SERPL-SCNC: 107 MMOL/L (ref 96–112)
CO2 SERPL-SCNC: 23 MMOL/L (ref 20–33)
CREAT SERPL-MCNC: 0.59 MG/DL (ref 0.5–1.4)
ERYTHROCYTE [DISTWIDTH] IN BLOOD BY AUTOMATED COUNT: 40.4 FL (ref 35.9–50)
GFR SERPLBLD CREATININE-BSD FMLA CKD-EPI: 131 ML/MIN/1.73 M 2
GLUCOSE SERPL-MCNC: 93 MG/DL (ref 65–99)
HCG SERPL QL: NEGATIVE
HCT VFR BLD AUTO: 40.9 % (ref 37–47)
HGB BLD-MCNC: 13.4 G/DL (ref 12–16)
MCH RBC QN AUTO: 27.9 PG (ref 27–33)
MCHC RBC AUTO-ENTMCNC: 32.8 G/DL (ref 32.2–35.5)
MCV RBC AUTO: 85.2 FL (ref 81.4–97.8)
PLATELET # BLD AUTO: 303 K/UL (ref 164–446)
PMV BLD AUTO: 10.4 FL (ref 9–12.9)
POTASSIUM SERPL-SCNC: 4.4 MMOL/L (ref 3.6–5.5)
RBC # BLD AUTO: 4.8 M/UL (ref 4.2–5.4)
SODIUM SERPL-SCNC: 141 MMOL/L (ref 135–145)
WBC # BLD AUTO: 11 K/UL (ref 4.8–10.8)

## 2023-06-28 PROCEDURE — 80048 BASIC METABOLIC PNL TOTAL CA: CPT

## 2023-06-28 PROCEDURE — 84703 CHORIONIC GONADOTROPIN ASSAY: CPT

## 2023-06-28 PROCEDURE — 36415 COLL VENOUS BLD VENIPUNCTURE: CPT

## 2023-06-28 PROCEDURE — 85027 COMPLETE CBC AUTOMATED: CPT

## 2023-06-30 ENCOUNTER — ANESTHESIA (OUTPATIENT)
Dept: SURGERY | Facility: MEDICAL CENTER | Age: 23
End: 2023-06-30
Payer: MEDICAID

## 2023-06-30 ENCOUNTER — ANESTHESIA EVENT (OUTPATIENT)
Dept: SURGERY | Facility: MEDICAL CENTER | Age: 23
End: 2023-06-30
Payer: MEDICAID

## 2023-06-30 ENCOUNTER — HOSPITAL ENCOUNTER (OUTPATIENT)
Facility: MEDICAL CENTER | Age: 23
End: 2023-06-30
Attending: SURGERY | Admitting: SURGERY
Payer: MEDICAID

## 2023-06-30 VITALS
TEMPERATURE: 97.9 F | DIASTOLIC BLOOD PRESSURE: 66 MMHG | HEART RATE: 78 BPM | BODY MASS INDEX: 43.38 KG/M2 | HEIGHT: 57 IN | SYSTOLIC BLOOD PRESSURE: 115 MMHG | WEIGHT: 201.06 LBS | RESPIRATION RATE: 16 BRPM | OXYGEN SATURATION: 97 %

## 2023-06-30 DIAGNOSIS — G89.18 POST-OPERATIVE PAIN: ICD-10-CM

## 2023-06-30 LAB — PATHOLOGY CONSULT NOTE: NORMAL

## 2023-06-30 PROCEDURE — 88304 TISSUE EXAM BY PATHOLOGIST: CPT

## 2023-06-30 PROCEDURE — 160048 HCHG OR STATISTICAL LEVEL 1-5: Performed by: SURGERY

## 2023-06-30 PROCEDURE — 700111 HCHG RX REV CODE 636 W/ 250 OVERRIDE (IP): Mod: JZ | Performed by: ANESTHESIOLOGY

## 2023-06-30 PROCEDURE — A9270 NON-COVERED ITEM OR SERVICE: HCPCS | Performed by: ANESTHESIOLOGY

## 2023-06-30 PROCEDURE — 700101 HCHG RX REV CODE 250: Performed by: SURGERY

## 2023-06-30 PROCEDURE — 160031 HCHG SURGERY MINUTES - 1ST 30 MINS LEVEL 5: Performed by: SURGERY

## 2023-06-30 PROCEDURE — 160009 HCHG ANES TIME/MIN: Performed by: SURGERY

## 2023-06-30 PROCEDURE — 160025 RECOVERY II MINUTES (STATS): Performed by: SURGERY

## 2023-06-30 PROCEDURE — 160036 HCHG PACU - EA ADDL 30 MINS PHASE I: Performed by: SURGERY

## 2023-06-30 PROCEDURE — 160046 HCHG PACU - 1ST 60 MINS PHASE II: Performed by: SURGERY

## 2023-06-30 PROCEDURE — 502714 HCHG ROBOTIC SURGERY SERVICES: Performed by: SURGERY

## 2023-06-30 PROCEDURE — 160002 HCHG RECOVERY MINUTES (STAT): Performed by: SURGERY

## 2023-06-30 PROCEDURE — 160042 HCHG SURGERY MINUTES - EA ADDL 1 MIN LEVEL 5: Performed by: SURGERY

## 2023-06-30 PROCEDURE — 700102 HCHG RX REV CODE 250 W/ 637 OVERRIDE(OP): Performed by: ANESTHESIOLOGY

## 2023-06-30 PROCEDURE — 160035 HCHG PACU - 1ST 60 MINS PHASE I: Performed by: SURGERY

## 2023-06-30 PROCEDURE — 700105 HCHG RX REV CODE 258: Mod: JZ | Performed by: SURGERY

## 2023-06-30 PROCEDURE — 00790 ANES IPER UPR ABD NOS: CPT | Performed by: ANESTHESIOLOGY

## 2023-06-30 PROCEDURE — 700104 HCHG RX REV CODE 254: Performed by: SURGERY

## 2023-06-30 PROCEDURE — 700101 HCHG RX REV CODE 250: Performed by: ANESTHESIOLOGY

## 2023-06-30 RX ORDER — DIPHENHYDRAMINE HYDROCHLORIDE 50 MG/ML
12.5 INJECTION INTRAMUSCULAR; INTRAVENOUS
Status: DISCONTINUED | OUTPATIENT
Start: 2023-06-30 | End: 2023-06-30 | Stop reason: HOSPADM

## 2023-06-30 RX ORDER — OXYCODONE HCL 5 MG/5 ML
10 SOLUTION, ORAL ORAL
Status: COMPLETED | OUTPATIENT
Start: 2023-06-30 | End: 2023-06-30

## 2023-06-30 RX ORDER — HALOPERIDOL 5 MG/ML
1 INJECTION INTRAMUSCULAR
Status: DISCONTINUED | OUTPATIENT
Start: 2023-06-30 | End: 2023-06-30 | Stop reason: HOSPADM

## 2023-06-30 RX ORDER — KETOROLAC TROMETHAMINE 30 MG/ML
INJECTION, SOLUTION INTRAMUSCULAR; INTRAVENOUS PRN
Status: DISCONTINUED | OUTPATIENT
Start: 2023-06-30 | End: 2023-06-30 | Stop reason: SURG

## 2023-06-30 RX ORDER — HYDROMORPHONE HYDROCHLORIDE 1 MG/ML
0.2 INJECTION, SOLUTION INTRAMUSCULAR; INTRAVENOUS; SUBCUTANEOUS
Status: DISCONTINUED | OUTPATIENT
Start: 2023-06-30 | End: 2023-06-30 | Stop reason: HOSPADM

## 2023-06-30 RX ORDER — SODIUM CHLORIDE, SODIUM LACTATE, POTASSIUM CHLORIDE, CALCIUM CHLORIDE 600; 310; 30; 20 MG/100ML; MG/100ML; MG/100ML; MG/100ML
INJECTION, SOLUTION INTRAVENOUS CONTINUOUS
Status: DISCONTINUED | OUTPATIENT
Start: 2023-06-30 | End: 2023-06-30 | Stop reason: HOSPADM

## 2023-06-30 RX ORDER — MEPERIDINE HYDROCHLORIDE 25 MG/ML
6.25 INJECTION INTRAMUSCULAR; INTRAVENOUS; SUBCUTANEOUS
Status: DISCONTINUED | OUTPATIENT
Start: 2023-06-30 | End: 2023-06-30 | Stop reason: HOSPADM

## 2023-06-30 RX ORDER — HYDROMORPHONE HYDROCHLORIDE 1 MG/ML
0.1 INJECTION, SOLUTION INTRAMUSCULAR; INTRAVENOUS; SUBCUTANEOUS
Status: DISCONTINUED | OUTPATIENT
Start: 2023-06-30 | End: 2023-06-30 | Stop reason: HOSPADM

## 2023-06-30 RX ORDER — INDOCYANINE GREEN AND WATER 25 MG
2.5 KIT INJECTION ONCE
Status: COMPLETED | OUTPATIENT
Start: 2023-06-30 | End: 2023-06-30

## 2023-06-30 RX ORDER — ONDANSETRON 2 MG/ML
4 INJECTION INTRAMUSCULAR; INTRAVENOUS
Status: DISCONTINUED | OUTPATIENT
Start: 2023-06-30 | End: 2023-06-30 | Stop reason: HOSPADM

## 2023-06-30 RX ORDER — HYDROMORPHONE HYDROCHLORIDE 1 MG/ML
0.4 INJECTION, SOLUTION INTRAMUSCULAR; INTRAVENOUS; SUBCUTANEOUS
Status: DISCONTINUED | OUTPATIENT
Start: 2023-06-30 | End: 2023-06-30 | Stop reason: HOSPADM

## 2023-06-30 RX ORDER — CEFAZOLIN SODIUM 1 G/3ML
INJECTION, POWDER, FOR SOLUTION INTRAMUSCULAR; INTRAVENOUS PRN
Status: DISCONTINUED | OUTPATIENT
Start: 2023-06-30 | End: 2023-06-30 | Stop reason: SURG

## 2023-06-30 RX ORDER — OXYCODONE HCL 5 MG/5 ML
5 SOLUTION, ORAL ORAL
Status: COMPLETED | OUTPATIENT
Start: 2023-06-30 | End: 2023-06-30

## 2023-06-30 RX ORDER — LIDOCAINE HYDROCHLORIDE 20 MG/ML
INJECTION, SOLUTION EPIDURAL; INFILTRATION; INTRACAUDAL; PERINEURAL PRN
Status: DISCONTINUED | OUTPATIENT
Start: 2023-06-30 | End: 2023-06-30 | Stop reason: SURG

## 2023-06-30 RX ORDER — ONDANSETRON 2 MG/ML
INJECTION INTRAMUSCULAR; INTRAVENOUS PRN
Status: DISCONTINUED | OUTPATIENT
Start: 2023-06-30 | End: 2023-06-30 | Stop reason: SURG

## 2023-06-30 RX ORDER — MIDAZOLAM HYDROCHLORIDE 1 MG/ML
INJECTION INTRAMUSCULAR; INTRAVENOUS PRN
Status: DISCONTINUED | OUTPATIENT
Start: 2023-06-30 | End: 2023-06-30 | Stop reason: SURG

## 2023-06-30 RX ORDER — BUPIVACAINE HYDROCHLORIDE AND EPINEPHRINE 5; 5 MG/ML; UG/ML
INJECTION, SOLUTION EPIDURAL; INTRACAUDAL; PERINEURAL
Status: DISCONTINUED | OUTPATIENT
Start: 2023-06-30 | End: 2023-06-30 | Stop reason: HOSPADM

## 2023-06-30 RX ORDER — ONDANSETRON 4 MG/1
4 TABLET, ORALLY DISINTEGRATING ORAL EVERY 6 HOURS PRN
COMMUNITY

## 2023-06-30 RX ORDER — OXYCODONE HYDROCHLORIDE AND ACETAMINOPHEN 5; 325 MG/1; MG/1
1 TABLET ORAL EVERY 4 HOURS PRN
Qty: 24 TABLET | Refills: 0 | Status: SHIPPED | OUTPATIENT
Start: 2023-06-30 | End: 2023-07-05

## 2023-06-30 RX ORDER — NAPROXEN 500 MG/1
500 TABLET ORAL 2 TIMES DAILY
COMMUNITY

## 2023-06-30 RX ADMIN — SUGAMMADEX 200 MG: 100 INJECTION, SOLUTION INTRAVENOUS at 09:42

## 2023-06-30 RX ADMIN — ROCURONIUM BROMIDE 50 MG: 10 INJECTION, SOLUTION INTRAVENOUS at 09:04

## 2023-06-30 RX ADMIN — LIDOCAINE HYDROCHLORIDE 100 MG: 20 INJECTION, SOLUTION EPIDURAL; INFILTRATION; INTRACAUDAL at 09:04

## 2023-06-30 RX ADMIN — FENTANYL CITRATE 100 MCG: 50 INJECTION, SOLUTION INTRAMUSCULAR; INTRAVENOUS at 09:04

## 2023-06-30 RX ADMIN — ONDANSETRON 4 MG: 2 INJECTION INTRAMUSCULAR; INTRAVENOUS at 09:42

## 2023-06-30 RX ADMIN — MIDAZOLAM 2 MG: 1 INJECTION, SOLUTION INTRAMUSCULAR; INTRAVENOUS at 09:04

## 2023-06-30 RX ADMIN — FENTANYL CITRATE 25 MCG: 50 INJECTION, SOLUTION INTRAMUSCULAR; INTRAVENOUS at 10:15

## 2023-06-30 RX ADMIN — FENTANYL CITRATE 25 MCG: 50 INJECTION, SOLUTION INTRAMUSCULAR; INTRAVENOUS at 10:29

## 2023-06-30 RX ADMIN — KETOROLAC TROMETHAMINE 30 MG: 30 INJECTION, SOLUTION INTRAMUSCULAR; INTRAVENOUS at 09:42

## 2023-06-30 RX ADMIN — SODIUM CHLORIDE, POTASSIUM CHLORIDE, SODIUM LACTATE AND CALCIUM CHLORIDE: 600; 310; 30; 20 INJECTION, SOLUTION INTRAVENOUS at 08:23

## 2023-06-30 RX ADMIN — OXYCODONE HYDROCHLORIDE 10 MG: 5 SOLUTION ORAL at 10:15

## 2023-06-30 RX ADMIN — INDOCYANINE GREEN AND WATER 2.5 MG: KIT at 08:23

## 2023-06-30 RX ADMIN — PROPOFOL 200 MG: 10 INJECTION, EMULSION INTRAVENOUS at 09:04

## 2023-06-30 RX ADMIN — CEFAZOLIN 3 G: 1 INJECTION, POWDER, FOR SOLUTION INTRAMUSCULAR; INTRAVENOUS at 09:08

## 2023-06-30 ASSESSMENT — PAIN DESCRIPTION - PAIN TYPE: TYPE: SURGICAL PAIN

## 2023-06-30 ASSESSMENT — FIBROSIS 4 INDEX: FIB4 SCORE: 0.29

## 2023-06-30 NOTE — OR NURSING
1103- Patient arrived to phase 2. Patient changed out of surgical gown into clothes. Patient is A&Ox4. Patient reports no pain and no nausea. Vital signs taken and patient assessed. Asked the patient if they had to use the bathroom. The patient declined.    1110- IV removed and discharge instructions read. All questions answered.     1128- Adriane NG transported the patient to ride via wheelchair. Discharged to care of responsible adult.

## 2023-06-30 NOTE — ANESTHESIA PROCEDURE NOTES
Airway    Date/Time: 6/30/2023 9:08 AM    Performed by: Omar Ewing M.D.  Authorized by: Omar Ewing M.D.    Location:  OR  Urgency:  Elective  Indications for Airway Management:  Anesthesia      Spontaneous Ventilation: absent    Sedation Level:  Deep  Preoxygenated: Yes    Patient Position:  Sniffing  Final Airway Type:  Endotracheal airway  Final Endotracheal Airway:  ETT  Cuffed: Yes    Technique Used for Successful ETT Placement:  Direct laryngoscopy    Insertion Site:  Oral  Blade Type:  Jakub  Laryngoscope Blade/Videolaryngoscope Blade Size:  3  ETT Size (mm):  7.0  Measured from:  Teeth  ETT to Teeth (cm):  20  Placement Verified by: auscultation and capnometry    Cormack-Lehane Classification:  Grade I - full view of glottis  Number of Attempts at Approach:  1

## 2023-06-30 NOTE — OR NURSING
"0955: To PACU from OR via gurney,  sleeping, respirations spontaneous and non-labored via OPA. Stable on 6L O2 via mask. Ice pack applied over c/d/i mid abdomen surgical sites.     1005: OPA out. Remains stable on 6L mask.    1020: Pt c/o 10/10 pain, medicated per prn orders. Fentanyl 25mcg given IV to start, will monitor tolerance and effectiveness.      1035: Pt c/o pain 6/10, subsequent dose provided. C/o mid chest, right and left shoulder \"ache\". Updated , CADENO at this time. Education provided to pt regarding referred pain from air placed during surgery.     1045: Pt states pain is 6/10, tolerable. Pt stated feeling SOB after receiving IV pain meds, meds held at this time.     1100: Meets criteria to transfer to Stage 2.  "

## 2023-06-30 NOTE — OP REPORT
Surgeon: Jose F Haas MD   Assistant: Ronan Lira PA-C  Preoperative diagnosis: Chronic cholecystitis   Postop diagnosis: Chronic cholecystitis   Procedure: Robotic assisted laparoscopic cholecystectomy   Anesthesia: General endotracheal anesthesia   Anesthesiologist: Omar Ewing MD  Indications: 23-year-old woman with symptomatic gallstones and chronic cholecystitis.  A cholecystectomy is indicated.  Narrative: The procedure was discussed in detail with the patient including the robotic assisted laparoscopic approach, the potential for converting to an open procedure, and the associated risk of bleeding, infection, abscess, and hematoma. Also discussed the risk of postoperative bile leak, common bile duct stricture, common bile duct transection, and the significance of these complications. The patient understood all of the above and wished to proceed. The patient was placed under anesthesia by Dr. Ewing. Patient's abdomen was prepped with chlorhexidine prep and sterile drapes. After a time out an supraumbilical incision to the left of the midline was made.  Through this incision a Veress needle was placed.  Low pressure was confirmed and a pneumoperitoneum was achieved with co2 insufflation to a pressure of 15 mmhg mercury.  Under direct visualization 1 lateral left-sided and 2 right-sided da Suzette ports were placed.  The robot was docked and instruments were placed.  The gallbladder was identified and retracted superiorly and laterally. multiple adhesions were noted and these were carefully taken down. the base of the gallbladder was carefully dissected. the cystic duct was identified and could be seen to clearly exit the gallbladder and retract laterally along the gallbladder. In  a similar fashion the cystic artery was identified and dissected away from surrounding connective tissue. a critical view was obtained. subsequent to this 2 clips were placed proximally on the duct and 1 distally and it was divided  with scissors. Similarly, the cystic artery was clipped twice proximally and one distally and divided sharply with scissors. The gallbladder was then dissected off the liver bed and placed in a bag retrieval device. Hemostasis was assured with cautery. There was no evidence of bleeding or bile leak.  The gallbladder was removed through a port site.  Ports removed and the pneumoperitoneum was released.  The port that had been enlarged for removal of the gallbladder had its fascia approximated with an 0 Vicryl stitch using the Endo Close device.  The subcutaneous tissue was irrigated and the skin on all incisions was approximatedwith 4.0 vicryl  stitches. Dermabond was placed. The patient tolerated procedure well without apparent complication. Final counts were reported as correct.  Wound class was class II.    The first assist, Ronan Lira PA-C, was required for this operation due to the complexity of the case.  Ronan assisted with port placement and docking of the robot.  He also assisted with placing sutures and instruments into the patient at the bedside while I was at the robotic console.  Ronan remained at the bedside scrubbed and while I was at the console.  He also assisted in undocking and closing of incisions.   Oriented - self; Oriented - place; Oriented - time

## 2023-06-30 NOTE — DISCHARGE INSTRUCTIONS
Cholecystectomy Discharge instructions    ACTIVITIES: After discharge from the hospital, you may resume full routine activities. However, there should be no heavy lifting (greater than 15 pounds) and no strenuous activities until after your follow-up visit. Otherwise, routine activities of daily living are acceptable.    DRIVING: You may drive whenever you are off pain medications and are able to perform the activities needed to drive, i.e. turning, bending, twisting, etc.    BATHING: You may get the wound wet at any time after leaving the hospital. You may shower, but do not submerge in a bath for at least a week.    WOUND CARE:  It is normal to have tenderness, discomfort or mild swelling at the site of the incisions.  If you have wound dressings, they may come off after 48 hours. If you have skin glue to the wound, this will fall off on its own, do not pick at it. If you have steri strips to the wound, these will fall off on their own, do not pick at them, may trim the edges if needed.    Avoid getting lotions, powders or deodorant on the incision while it is healing. Don't worry if the area under either incision feels firm or hard. This is normal and usually softens within a few months.    BOWEL FUNCTION: A few patients, after this operation, will develop either frequent or loose stools after meals. This usually corrects itself after a few days, to a few weeks. If this occurs, do not worry; it is not unusual and will resolve. Much more common than loose stools, is constipation. The combination of pain medication and decreased activity level can cause constipation in otherwise normal patients. If you feel this is occurring, take a laxative (Milk of Magnesia, Ex-Lax, Senokot, etc.) until the problem has resolved.    It also helps to stay regular by including fiber in your diet (for example: bran or fruits and vegetables) and drink plenty of liquids (water, juice, etc.).      What to Expect Post Anesthesia    Rest  and take it easy for the first 24 hours.  A responsible adult is recommended to remain with you during that time.  It is normal to feel sleepy.  We encourage you to not do anything that requires balance, judgment or coordination.    FOR 24 HOURS DO NOT:  Drive, operate machinery or run household appliances.  Drink beer or alcoholic beverages.  Make important decisions or sign legal documents.    To avoid nausea, slowly advance diet as tolerated, avoiding spicy or greasy foods for the first day.  Add more substantial food to your diet according to your provider's instructions. INCREASE FLUIDS AND FIBER TO AVOID CONSTIPATION.    MILD FLU-LIKE SYMPTOMS ARE NORMAL.  YOU MAY EXPERIENCE GENERALIZED MUSCLE ACHES, THROAT IRRITATION, HEADACHE AND/OR SOME NAUSEA.    If any questions arise, call your provider,  Office #[phone].  If your provider is not available, please feel free to call the Surgical Center at [phone].    MEDICATIONS: Resume taking daily medication.  Take prescribed pain medication with food.  If no medication is prescribed, you may take non-aspirin pain medication if needed.  PAIN MEDICATION CAN BE VERY CONSTIPATING.  Take a stool softener or laxative such as senokot, pericolace, or milk of magnesia if needed.    Last pain medication given at

## 2023-06-30 NOTE — OR NURSING
0647: Brought patient back to pre-op and assumed care.   0728: Patient allergies and NPO status verified, home medication reconciliation completed and belongings secured. Patient verbalizes understanding of pain scale, expected course of stay and plan of care. Surgical site verified with patient. IV access established. Sequentials placed on legs.

## 2023-06-30 NOTE — ANESTHESIA PREPROCEDURE EVALUATION
Case: [id number] Date/Time: 06/30/23 0830    Procedure: ROBOTIC CHOLECYSTECTOMY (Abdomen)    Anesthesia type: General    Pre-op diagnosis: CHRONIC CHOLECYSTITIS WITH CALCULUS    Location:  OR 01 / SURGERY [facility]    Surgeons: Jose F Haas M.D.          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 - last seizure 7/17 when meds missed, Neruology appt 8/2 wnl, no f/u       Physical Exam    Airway   Mallampati: II  TM distance: >3 FB  Neck ROM: full       Cardiovascular - normal exam  Rhythm: regular  Rate: normal  (-) murmur     Dental - normal exam           Pulmonary - normal exam  Breath sounds clear to auscultation     Abdominal    Neurological - normal exam                 Anesthesia Plan    ASA 3   ASA physical status 3 criteria: morbid obesity - BMI greater than or equal to 40    Plan - general       Airway plan will be ETT          Induction: intravenous    Postoperative Plan: Postoperative administration of opioids is intended.    Pertinent diagnostic labs and testing reviewed    Informed Consent:    Anesthetic plan and risks discussed with patient.    Use of blood products discussed with: patient whom consented to blood products.

## 2023-06-30 NOTE — ANESTHESIA TIME REPORT
Anesthesia Start and Stop Event Times     Date Time Event    6/30/2023 0855 Ready for Procedure     0904 Anesthesia Start     1008 Anesthesia Stop        Responsible Staff  06/30/23    Name Role Begin End    Omar Ewing M.D. Anesth 0904 1008        Overtime Reason:  no overtime (within assigned shift)    Comments:

## 2023-06-30 NOTE — H&P
"  CHIEF COMPLAINT: Right upper quadrant pain    HISTORY OF PRESENT ILLNESS: The patient is a 23-year-old woman with recurrent midepigastric and upper quadrant pain.  Work-up has revealed gallstones.  Her history, physical, and imaging are all consistent with biliary colic and chronic cholecystitis.  She has not had any jaundice or acholic stools.    PAST MEDICAL HISTORY:   Seizure disorder  Chronic pain    PAST SURGICAL HISTORY:  has no past surgical history on file.    ALLERGIES: No Known Allergies    CURRENT MEDICATIONS:    Home Medications       Reviewed by Rosina Bauer R.N. (Registered Nurse) on 06/30/23 at 0742  Med List Status: Complete     Medication Last Dose Status   levetiracetam (KEPPRA) 750 MG tablet 6/30/2023 Active   naproxen (NAPROSYN) 500 MG Tab For after surgery Active   ondansetron (ZOFRAN ODT) 4 MG TABLET DISPERSIBLE For after surgery Active                    FAMILY HISTORY: family history includes Cancer in her maternal grandmother; Hypertension in her mother; Lung Disease in her mother; No Known Problems in her father and sister.    SOCIAL HISTORY:  reports that she has never smoked. She does not have any smokeless tobacco history on file. She reports that she does not currently use drugs. She reports that she does not drink alcohol.    REVIEW OF SYSTEMS: Comprehensive review of systems is negative with the exception of the aforementioned HPI, PMH, and PSH bullets in accordance with CMS guidelines.    PHYSICAL EXAMINATION:      Constitutional:     Vital Signs: /65   Pulse 80   Temp 36.2 °C (97.2 °F) (Temporal)   Resp 18   Ht 1.448 m (4' 9\")   Wt 91.2 kg (201 lb 1 oz)   SpO2 96%    General Appearance: alert in no acute distress.   HEENT:    Demonstrates symmetric, reactive pupils. Extraocular muscles   are intact. Nares and oropharynx are clear.   Neck:    Supple. No adenopathy.  Respiratory:   Inspection: Unlabored respirations, no intercostal retractions, paradoxical motion, or " accessory muscle use.   Auscultation: clear to auscultation.  Cardiovascular:   Inspection: The skin is warm.  Auscultation: Regular rate and rhythm.   Peripheral Pulses: Normal.   Abdomen:  Inspection: Abdominal inspection reveals  no incisions .   Palpation: Palpation is remarkable for no significant tenderness, guarding, or peritoneal findings. No abdominal wall hernias.  Extremities:   Examination of the upper and lower extremities demonstrates no cyanosis edema or clubbing.  Neurologic:   Alert & oriented to person, time and place. Normal motor function. Normal sensory function. No focal deficits noted.    LABORATORY VALUES:   Recent Labs     06/28/23  1150   WBC 11.0*   RBC 4.80   HEMOGLOBIN 13.4   HEMATOCRIT 40.9   MCV 85.2   MCH 27.9   MCHC 32.8   RDW 40.4   PLATELETCT 303   MPV 10.4     Recent Labs     06/28/23  1150   SODIUM 141   POTASSIUM 4.4   CHLORIDE 107   CO2 23   GLUCOSE 93   BUN 6*   CREATININE 0.59   CALCIUM 9.1                IMAGING:   No orders to display       ASSESSMENT AND PLAN:   23-year-old woman with evidence by history, physical, and imaging of chronic cholecystitis.  A cholecystectomy is indicated.  I discussed the procedure with her in detail including the da Suzette assisted approach, potential for converting to a laparoscopic or open procedure, and the associated risk of bleeding, infection, abscess, and hematoma.  Also discussed risk of postoperative bile leak, common bile duct stricture, common bile duct transection, and the significance of these complications.  She understands all the above and does wish to proceed.  We will make arrangements.   ____________________________________     Jose F Haas M.D.    DD: 6/30/2023  8:31 AM

## 2023-06-30 NOTE — ANESTHESIA POSTPROCEDURE EVALUATION
Patient: Joselin Natarajan    Procedure Summary     Date: 06/30/23 Room / Location:  OR  / SURGERY [facility]    Anesthesia Start: 0904 Anesthesia Stop: 1008    Procedure: ROBOTIC CHOLECYSTECTOMY (Abdomen) Diagnosis: (CHRONIC CHOLECYSTITIS WITH CALCULUS)    Surgeons: Jose F Haas M.D. Responsible Provider: Omar Ewing M.D.    Anesthesia Type: general ASA Status: 3          Final Anesthesia Type: general  Last vitals  BP   Blood Pressure: 112/63    Temp   36 °C (96.8 °F)    Pulse   (!) 101   Resp   (!) 22    SpO2   96 %      Anesthesia Post Evaluation    Patient location during evaluation: PACU  Patient participation: complete - patient participated  Level of consciousness: awake and alert    Airway patency: patent  Anesthetic complications: no  Cardiovascular status: hemodynamically stable  Respiratory status: acceptable  Hydration status: euvolemic    PONV: none          There were no known notable events for this encounter.     Nurse Pain Score: 0 (NPRS)

## 2024-11-06 ENCOUNTER — HOSPITAL ENCOUNTER (OUTPATIENT)
Dept: LAB | Facility: MEDICAL CENTER | Age: 24
End: 2024-11-06
Attending: NURSE PRACTITIONER
Payer: MEDICAID

## 2024-11-06 LAB
ALBUMIN SERPL BCP-MCNC: 4 G/DL (ref 3.2–4.9)
ALBUMIN/GLOB SERPL: 1.1 G/DL
ALP SERPL-CCNC: 101 U/L (ref 30–99)
ALT SERPL-CCNC: 29 U/L (ref 2–50)
ANION GAP SERPL CALC-SCNC: 10 MMOL/L (ref 7–16)
AST SERPL-CCNC: 24 U/L (ref 12–45)
BASOPHILS # BLD AUTO: 0.3 % (ref 0–1.8)
BASOPHILS # BLD: 0.04 K/UL (ref 0–0.12)
BILIRUB SERPL-MCNC: 0.4 MG/DL (ref 0.1–1.5)
BUN SERPL-MCNC: 7 MG/DL (ref 8–22)
CALCIUM ALBUM COR SERPL-MCNC: 9.5 MG/DL (ref 8.5–10.5)
CALCIUM SERPL-MCNC: 9.5 MG/DL (ref 8.5–10.5)
CHLORIDE SERPL-SCNC: 104 MMOL/L (ref 96–112)
CO2 SERPL-SCNC: 25 MMOL/L (ref 20–33)
CREAT SERPL-MCNC: 0.65 MG/DL (ref 0.5–1.4)
EOSINOPHIL # BLD AUTO: 0.16 K/UL (ref 0–0.51)
EOSINOPHIL NFR BLD: 1.3 % (ref 0–6.9)
ERYTHROCYTE [DISTWIDTH] IN BLOOD BY AUTOMATED COUNT: 39.4 FL (ref 35.9–50)
GFR SERPLBLD CREATININE-BSD FMLA CKD-EPI: 127 ML/MIN/1.73 M 2
GLOBULIN SER CALC-MCNC: 3.8 G/DL (ref 1.9–3.5)
GLUCOSE SERPL-MCNC: 82 MG/DL (ref 65–99)
HCT VFR BLD AUTO: 41.4 % (ref 37–47)
HGB BLD-MCNC: 14.1 G/DL (ref 12–16)
IMM GRANULOCYTES # BLD AUTO: 0.04 K/UL (ref 0–0.11)
IMM GRANULOCYTES NFR BLD AUTO: 0.3 % (ref 0–0.9)
LYMPHOCYTES # BLD AUTO: 3.68 K/UL (ref 1–4.8)
LYMPHOCYTES NFR BLD: 30.1 % (ref 22–41)
MCH RBC QN AUTO: 29.6 PG (ref 27–33)
MCHC RBC AUTO-ENTMCNC: 34.1 G/DL (ref 32.2–35.5)
MCV RBC AUTO: 86.8 FL (ref 81.4–97.8)
MONOCYTES # BLD AUTO: 0.72 K/UL (ref 0–0.85)
MONOCYTES NFR BLD AUTO: 5.9 % (ref 0–13.4)
NEUTROPHILS # BLD AUTO: 7.59 K/UL (ref 1.82–7.42)
NEUTROPHILS NFR BLD: 62.1 % (ref 44–72)
NRBC # BLD AUTO: 0 K/UL
NRBC BLD-RTO: 0 /100 WBC (ref 0–0.2)
PLATELET # BLD AUTO: 319 K/UL (ref 164–446)
PMV BLD AUTO: 10.9 FL (ref 9–12.9)
POTASSIUM SERPL-SCNC: 4.3 MMOL/L (ref 3.6–5.5)
PROT SERPL-MCNC: 7.8 G/DL (ref 6–8.2)
RBC # BLD AUTO: 4.77 M/UL (ref 4.2–5.4)
SODIUM SERPL-SCNC: 139 MMOL/L (ref 135–145)
WBC # BLD AUTO: 12.2 K/UL (ref 4.8–10.8)

## 2024-11-06 PROCEDURE — 36415 COLL VENOUS BLD VENIPUNCTURE: CPT

## 2024-11-06 PROCEDURE — 80053 COMPREHEN METABOLIC PANEL: CPT

## 2024-11-06 PROCEDURE — 85025 COMPLETE CBC W/AUTO DIFF WBC: CPT

## 2025-04-09 ENCOUNTER — OFFICE VISIT (OUTPATIENT)
Dept: MEDICAL GROUP | Facility: MEDICAL CENTER | Age: 25
End: 2025-04-09
Attending: NURSE PRACTITIONER
Payer: MEDICAID

## 2025-04-09 VITALS
WEIGHT: 174.3 LBS | SYSTOLIC BLOOD PRESSURE: 96 MMHG | HEIGHT: 57 IN | RESPIRATION RATE: 16 BRPM | HEART RATE: 58 BPM | OXYGEN SATURATION: 100 % | DIASTOLIC BLOOD PRESSURE: 68 MMHG | BODY MASS INDEX: 37.6 KG/M2 | TEMPERATURE: 96.8 F

## 2025-04-09 DIAGNOSIS — R53.83 OTHER FATIGUE: ICD-10-CM

## 2025-04-09 DIAGNOSIS — H91.93 BILATERAL HEARING LOSS, UNSPECIFIED HEARING LOSS TYPE: ICD-10-CM

## 2025-04-09 DIAGNOSIS — L65.9 HAIR LOSS: ICD-10-CM

## 2025-04-09 PROCEDURE — 99214 OFFICE O/P EST MOD 30 MIN: CPT | Performed by: NURSE PRACTITIONER

## 2025-04-09 PROCEDURE — 3078F DIAST BP <80 MM HG: CPT | Performed by: NURSE PRACTITIONER

## 2025-04-09 PROCEDURE — 99212 OFFICE O/P EST SF 10 MIN: CPT | Performed by: NURSE PRACTITIONER

## 2025-04-09 PROCEDURE — 3074F SYST BP LT 130 MM HG: CPT | Performed by: NURSE PRACTITIONER

## 2025-04-09 ASSESSMENT — PATIENT HEALTH QUESTIONNAIRE - PHQ9: CLINICAL INTERPRETATION OF PHQ2 SCORE: 0

## 2025-04-09 ASSESSMENT — FIBROSIS 4 INDEX: FIB4 SCORE: 0.32

## 2025-04-11 NOTE — PROGRESS NOTES
Verbal consent was acquired by the patient to use [employer] ambient listening note generation during this visit     Chief Complaint   Patient presents with    Hair Loss    Hearing Problem     Worse on the right side.       Subjective:     HPI:   History of Present Illness  The patient presents for evaluation of hair loss and hearing loss.    She has been experiencing significant hair loss for several months, with no recent changes in her health status or dietary habits. She reports increased fatigue but does not endorse any stress. She is not currently taking any supplements for hair loss. The hair loss is so severe that she notices a substantial amount of hair shedding during showers and finds handfuls of hair on her bed after sleeping.    She also reports a progressive decline in her hearing, predominantly affecting the right ear. She frequently uses headphones and has no history of recurrent ear infections during childhood. There is no known family history of premature hearing loss.    She expresses concern about a potential cancer diagnosis due to a family history of cancer on her mother's side, although she is uncertain whether these relatives were immediate or extended family members.    Supplemental Information  She has discontinued her Keppra medication and has been seizure-free for the past 2 years.    FAMILY HISTORY  She reports a history of cancer on her mother's side of the family, with multiple family members having  from it. There is no known family history of premature hearing loss.    MEDICATIONS  Discontinued: Keppra        No problems updated.    ROS  See HPI     No Known Allergies    Current medicines (including changes today)  Current Outpatient Medications   Medication Sig Dispense Refill    naproxen (NAPROSYN) 500 MG Tab Take 500 mg by mouth 2 times a day.      ondansetron (ZOFRAN ODT) 4 MG TABLET DISPERSIBLE Take 4 mg by mouth every 6 hours as needed.       No current  "facility-administered medications for this visit.       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 - last seizure 7/17 when meds missed, Neruology appt 8/2 wnl, no f/u 06/05/2018    PTSD (post-traumatic stress disorder) 06/05/2018       Family History   Problem Relation Age of Onset    Hypertension Mother     Lung Disease Mother         asthma    No Known Problems Father     No Known Problems Sister     Cancer Maternal Grandmother         breast          Objective:     BP 96/68 (BP Location: Left arm, Patient Position: Sitting, BP Cuff Size: Adult)   Pulse (!) 58   Temp 36 °C (96.8 °F) (Temporal)   Resp 16   Ht 1.448 m (4' 9\")   Wt 79.1 kg (174 lb 4.8 oz)   SpO2 100%  Body mass index is 37.72 kg/m².    Physical Exam:  Physical Exam  Vitals reviewed.   Constitutional:       General: She is awake.      Appearance: Normal appearance. She is well-developed.   HENT:      Head: Normocephalic.   Eyes:      Conjunctiva/sclera: Conjunctivae normal.   Cardiovascular:      Rate and Rhythm: Normal rate.   Pulmonary:      Effort: Pulmonary effort is normal. No respiratory distress.   Musculoskeletal:      Cervical back: Neck supple.   Skin:     General: Skin is warm and dry.   Neurological:      Mental Status: She is alert and oriented to person, place, and time.   Psychiatric:         Mood and Affect: Mood normal.         Behavior: Behavior normal. Behavior is cooperative.              Assessment and Plan:     The following treatment plan was discussed:    Problem List Items Addressed This Visit    None  Visit Diagnoses         Bilateral hearing loss, unspecified hearing " loss type        Relevant Orders    Referral to Audiology      Hair loss        Relevant Orders    TSH    FREE THYROXINE    TRIIDOTHYRONINE    THYROID PEROXIDASE  (TPO) AB      Other fatigue        Relevant Orders    TSH    FREE THYROXINE    TRIIDOTHYRONINE    THYROID PEROXIDASE  (TPO) AB            Assessment & Plan  1. Hair loss.  She reports significant hair loss over the past couple of months, particularly noticeable during showers and upon waking. There have been no recent illnesses, dietary changes, or stress that could contribute to this symptom. A thyroid function test will be conducted to rule out hypothyroidism. She has been advised against the use of any hair loss supplements until after the completion of the tests, as they may interfere with hormone levels. If the thyroid function test returns normal, a referral to a dermatologist will be considered.    2. Hearing loss.  She reports worsening hearing, primarily in the right ear, with no history of ear infections or family history of early hearing loss. A referral for audiological evaluation will be made to assess the extent of hearing loss and determine the appropriate course of action.    3. Cancer screening.  She expresses concern about a family history of cancer on her mother's side. It was explained that there is no general blood test for cancer and that screening is symptom-based. She was advised to report any abnormal symptoms, such as breast pain or changes in breast tissue, bloody stools, or changes in bowel habits, for further evaluation and appropriate screening tests like mammograms or colonoscopies.    Any change or worsening of signs or symptoms, patient encouraged to follow-up or report to emergency room for further evaluation. Patient verbalizes understanding and agrees.      PLEASE NOTE: This dictation was created using voice recognition software. I have made every reasonable attempt to correct obvious errors, but I expect that there are  errors of grammar and possibly content that I did not discover before finalizing the note.

## 2025-04-15 NOTE — Clinical Note
REFERRAL APPROVAL NOTICE         Sent on April 15, 2025                   Joselin Natarajan  [address]   [address]  [facility] [place] [address]                   Dear Ms. Ilir Natarajan,    After a careful review of the medical information and benefit coverage, [facility] has processed your referral. See below for additional details.    If applicable, you must be actively enrolled with your insurance for coverage of the authorized service. If you have any questions regarding your coverage, please contact your insurance directly.    REFERRAL INFORMATION   Referral #:  [id number]  Referred-To Department    Referred-By Provider:  Audiology    ROSS Mayer   [facility]      [address] [id number] [phone] [address] [facility] [id number] [phone]    Referral Start Date:  04/09/2025  Referral End Date:   04/09/2026             SCHEDULING  If you do not already have an appointment, please call [phone] to make an appointment.     MORE INFORMATION  If you do not already have a [facility] account, sign up at: [email or web address]  You can access your medical information, make appointments, see lab results, billing information, and more.  If you have questions regarding this referral, please contact  the [facility] Referrals department at:             [phone]. Monday - Friday 8:00AM - 5:00PM.     Sincerely,    [facility]

## 2025-06-26 ENCOUNTER — APPOINTMENT (OUTPATIENT)
Dept: AUDIOLOGY | Facility: OTHER | Age: 25
End: 2025-06-26
Payer: MEDICAID

## 2025-08-14 ENCOUNTER — APPOINTMENT (OUTPATIENT)
Dept: AUDIOLOGY | Facility: OTHER | Age: 25
End: 2025-08-14
Payer: MEDICAID

## (undated) DEVICE — SYRINGE 30 ML LS (56/BX)

## (undated) DEVICE — SUCTION INSTRUMENT YANKAUER BULBOUS TIP W/O VENT (50EA/CA)

## (undated) DEVICE — ELECTRODE DUAL RETURN W/ CORD - (50/PK)

## (undated) DEVICE — COVER LIGHT HANDLE ALC PLUS DISP (18EA/BX)

## (undated) DEVICE — HOOK PERMANENT CAUTERY DA VINCI 10X'S REUSABLE

## (undated) DEVICE — LACTATED RINGERS INJ 1000 ML - (14EA/CA 60CA/PF)

## (undated) DEVICE — SENSOR OXIMETER ADULT SPO2 RD SET (20EA/BX)

## (undated) DEVICE — TUBING CLEARLINK DUO-VENT - C-FLO (48EA/CA)

## (undated) DEVICE — ANTI-FOG SOLUTION - 60BTL/CA

## (undated) DEVICE — COVER TIP ENDOWRIST HOT SHEAR - (10EA/BX) DA VINCI

## (undated) DEVICE — SUTURE 4-0 MONOCRYL PLUS PS-2 - 27 INCH (36/BX)

## (undated) DEVICE — DRAPE ARM  BOX OF 20

## (undated) DEVICE — CLIP HEMOLOCK PURPLE - (14/BX)

## (undated) DEVICE — FORCEPS PROGRASP (18UN/EA)

## (undated) DEVICE — WATER IRRIGATION STERILE 1000ML (12EA/CA)

## (undated) DEVICE — OBTURATOR BLADELESS STANDARD 8MM (6EA/BX)

## (undated) DEVICE — SET SUCTION/IRRIGATION WITH DISPOSABLE TIP (6/CA )PART #0250-070-520 IS A SUB

## (undated) DEVICE — CHLORAPREP 26 ML APPLICATOR - ORANGE TINT(25/CA)

## (undated) DEVICE — CANISTER SUCTION 3000ML MECHANICAL FILTER AUTO SHUTOFF MEDI-VAC NONSTERILE LF DISP  (40EA/CA)

## (undated) DEVICE — BAG RETRIEVAL 5MM (10EA/BX)

## (undated) DEVICE — Device

## (undated) DEVICE — SET EXTENSION WITH 2 PORTS (48EA/CA) ***PART #2C8610 IS A SUBSTITUTE*****

## (undated) DEVICE — SEAL 5MM-8MM UNIVERSAL  BOX OF 10

## (undated) DEVICE — DRAPE COLUMN  BOX OF 20

## (undated) DEVICE — ROBOTIC SURGERY SERVICES

## (undated) DEVICE — SLEEVE, VASO, THIGH, MED

## (undated) DEVICE — DERMABOND ADVANCED - (12EA/BX)

## (undated) DEVICE — CLIP APPLIER LARGE DA VINCI 100X'S REUSABLE

## (undated) DEVICE — GLOVE BIOGEL SZ 8 SURGICAL PF LTX - (50PR/BX 4BX/CA)

## (undated) DEVICE — GOWN WARMING STANDARD FLEX - (30/CA)

## (undated) DEVICE — SUTURE GENERAL

## (undated) DEVICE — SET LEADWIRE 5 LEAD BEDSIDE DISPOSABLE ECG (1SET OF 5/EA)